# Patient Record
Sex: MALE | Race: BLACK OR AFRICAN AMERICAN | NOT HISPANIC OR LATINO | Employment: FULL TIME | URBAN - METROPOLITAN AREA
[De-identification: names, ages, dates, MRNs, and addresses within clinical notes are randomized per-mention and may not be internally consistent; named-entity substitution may affect disease eponyms.]

---

## 2018-03-27 ENCOUNTER — OFFICE VISIT (OUTPATIENT)
Dept: CARDIOLOGY | Facility: CLINIC | Age: 68
End: 2018-03-27
Attending: INTERNAL MEDICINE
Payer: MEDICARE

## 2018-03-27 VITALS
HEIGHT: 72 IN | SYSTOLIC BLOOD PRESSURE: 132 MMHG | WEIGHT: 146 LBS | HEART RATE: 71 BPM | DIASTOLIC BLOOD PRESSURE: 79 MMHG | BODY MASS INDEX: 19.77 KG/M2

## 2018-03-27 DIAGNOSIS — Z79.01 CHRONIC ANTICOAGULATION: ICD-10-CM

## 2018-03-27 DIAGNOSIS — I48.20 CHRONIC ATRIAL FIBRILLATION: ICD-10-CM

## 2018-03-27 PROCEDURE — 93000 ELECTROCARDIOGRAM COMPLETE: CPT | Mod: S$GLB,,, | Performed by: INTERNAL MEDICINE

## 2018-03-27 PROCEDURE — 99214 OFFICE O/P EST MOD 30 MIN: CPT | Mod: S$GLB,,, | Performed by: INTERNAL MEDICINE

## 2018-03-27 NOTE — PROGRESS NOTES
Subjective:     Hany Glass is a 67 y.o. male with essentially unremarkable past medical history. Noted to have mild palpitations in about 12/2014. Did not really bother him. Then saw his internist on 4/17/2015 and noted to be in atrial fibrillation with well controlled ventricular response rate. He was referred for an Echocardiogram on 5/26/2015 that revealed normal left ventricular size with mildly depressed systolic function. The left atrium was of normal size. On 5/29/2015 he underwent a Holter which revealed paroxysmal atrial fibrillation with intermittent atrial fibrillation with well controlled as well as fast ventricular response rate with activities. There was normal sinus rhythm in between. There were no symptoms during the Holter recording. I saw him on 9/18/2015 and with a CHADS2 score of 0 but with a [id number] of 1 (A) especially in the setting of mild left ventricular dysfunction it was felt he should be anticoagulated and he was advised to start rivaroxaban and stop aspirin. He was given the prescription but decided to stay on the aspirin as he was feeling well and did not have any palpitations. He was feeling well in 1/2016 when I saw him and he again opted to stay on aspirin. On 6/12/2017 he had a Holter which revealed persistent atrial fibrillation 76 () bpm. There has been no weak spells. No exertional chest pain or exertional dyspnea. No bleeding. Feeling well overall.    Atrial Fibrillation   Presents for follow-up visit. Symptoms are negative for bradycardia, chest pain, dizziness, hemodynamic instability, hypertension, hypotension, palpitations, shortness of breath, syncope, tachycardia and weakness. The symptoms have been stable. Past treatments include nothing. Past medical history includes atrial fibrillation.       Review of Systems   Constitution: Negative for chills, fever, weakness and malaise/fatigue.   HENT: Negative for nosebleeds.    Eyes: Negative for discharge,  vision loss in left eye and vision loss in right eye.   Cardiovascular: Negative for chest pain, claudication, dyspnea on exertion, irregular heartbeat, leg swelling, near-syncope, orthopnea, palpitations, paroxysmal nocturnal dyspnea and syncope.   Respiratory: Negative for cough, hemoptysis, shortness of breath and wheezing.    Endocrine: Negative for cold intolerance and heat intolerance.   Hematologic/Lymphatic: Negative for bleeding problem. Does not bruise/bleed easily.   Skin: Negative for color change and rash.   Musculoskeletal: Negative for arthritis, falls and myalgias.   Gastrointestinal: Negative for heartburn, hematemesis, hematochezia, hemorrhoids, jaundice, melena, nausea and vomiting.   Genitourinary: Negative for dysuria and hematuria.   Neurological: Negative for dizziness, focal weakness, headaches, light-headedness, loss of balance and vertigo.   Psychiatric/Behavioral: Negative for altered mental status, depression and substance abuse. The patient is not nervous/anxious.    Allergic/Immunologic: Negative for hives and persistent infections.       Current Outpatient Prescriptions on File Prior to Visit   Medication Sig Dispense Refill    aspirin (ECOTRIN) 81 MG EC tablet TAKE 1 TABLET BY MOUTH EVERY DAY 30 tablet 0    metoprolol succinate (TOPROL-XL) 25 MG 24 hr tablet Take 1 tablet (25 mg total) by mouth once daily. 90 tablet 3     No current facility-administered medications on file prior to visit.        /79   Pulse 71   Ht 6' (1.829 m)   Wt 66.2 kg (146 lb)   BMI 19.80 kg/m²       Objective:     Physical Exam   Constitutional: He is oriented to person, place, and time. He appears well-developed and well-nourished. He does not appear ill. No distress.   HENT:   Head: Normocephalic and atraumatic.   Nose: Nose normal.   Mouth/Throat: No oropharyngeal exudate.   Eyes: Right eye exhibits no discharge. Left eye exhibits no discharge. Right conjunctiva has no hemorrhage. Left  conjunctiva has no hemorrhage. Right pupil is round. Left pupil is round. Pupils are equal.   Neck: Neck supple. No JVD present. Carotid bruit is not present. No thyromegaly present.   Cardiovascular: Normal rate, S1 normal and S2 normal.  An irregularly irregular rhythm present.  No extrasystoles are present. PMI is not displaced.  Exam reveals no gallop.    No murmur heard.  Pulses:       Radial pulses are 2+ on the right side, and 2+ on the left side.        Dorsalis pedis pulses are 2+ on the right side, and 2+ on the left side.        Posterior tibial pulses are 2+ on the right side, and 2+ on the left side.   Pulmonary/Chest: Effort normal and breath sounds normal.   Abdominal: Soft. Normal appearance. There is no hepatosplenomegaly. There is no tenderness.   Musculoskeletal:        Right ankle: He exhibits no swelling, no ecchymosis and no deformity.        Left ankle: He exhibits no swelling, no ecchymosis and no deformity.   Lymphadenopathy:        Head (right side): No submandibular adenopathy present.        Head (left side): No submandibular adenopathy present.     He has no cervical adenopathy.   Neurological: He is alert and oriented to person, place, and time. He is not disoriented. No cranial nerve deficit or sensory deficit.   Skin: Skin is warm, dry and intact. No rash noted. He is not diaphoretic. Nails show no clubbing.   Psychiatric: He has a normal mood and affect. His speech is normal and behavior is normal. Judgment and thought content normal. Cognition and memory are normal.       Assessment:      1. Chronic atrial fibrillation    2. Chronic anticoagulation        Plan:     1. Atrial Fibrillation   4/17/2015: Diagnosed with essentially asymptomatic paroxysmal atrial fibrillation.    CHADS2=0. [id number]=0 (A).    5/26/2015: Echo: Normal left ventricular size with mildly depressed systolic function. Normal LA size.   5/29/2015: Holter: Paroxysmal atrial fibrillation with intermittent atrial  fibrillation with well as well as fast VRR. Sinus in between. No symptoms.   1/21/2016: ECG: NSR but on exam appeared to be in atrial fibrillation.   12/8/2016: ECG: Atrial fibrillation with VRR of 75 bpm.   6/12/2017: Holter: Atrial fibrillation 76 () bpm.   On metoprolol 25 mg Q24.   On aspirin 81 mg Q24 but advised NOAC.   Discussed issues at length with patient: Stroke risk 2-3 %/year.   Advised strongly to change to NOAC.   Told to change to apixiban.   Could possibly consider an ablation. Discussed in detail.     2. Chronic Anticoagulation   4/17/2015: Diagnosed with essentially asymptomatic paroxysmal atrial fibrillation.    CHADS2=0. [id number]=8 (A).    6/2016: Advised apixiban 5 mg Q12.   3/27/2018: Again strongly advised apixiban.    On aspirin 81 mg Q24.   No aspirin or NSAID.   No bleeding.      3. Primary Care   Dr. Monet Leach    F/u 4 months.    Rickie Gallardo M.D.

## 2018-08-08 ENCOUNTER — OFFICE VISIT (OUTPATIENT)
Dept: CARDIOLOGY | Facility: CLINIC | Age: 68
End: 2018-08-08
Attending: INTERNAL MEDICINE
Payer: MEDICARE

## 2018-08-08 VITALS
BODY MASS INDEX: 19.64 KG/M2 | SYSTOLIC BLOOD PRESSURE: 136 MMHG | WEIGHT: 145 LBS | DIASTOLIC BLOOD PRESSURE: 74 MMHG | HEART RATE: 77 BPM | HEIGHT: 72 IN

## 2018-08-08 DIAGNOSIS — I48.19 PERSISTENT ATRIAL FIBRILLATION: ICD-10-CM

## 2018-08-08 DIAGNOSIS — I48.20 CHRONIC ATRIAL FIBRILLATION: ICD-10-CM

## 2018-08-08 DIAGNOSIS — Z79.01 CHRONIC ANTICOAGULATION: ICD-10-CM

## 2018-08-08 PROCEDURE — 99214 OFFICE O/P EST MOD 30 MIN: CPT | Mod: S$GLB,,, | Performed by: INTERNAL MEDICINE

## 2018-08-08 RX ORDER — METOPROLOL SUCCINATE 25 MG/1
25 TABLET, EXTENDED RELEASE ORAL DAILY
Qty: 90 TABLET | Refills: 3 | Status: SHIPPED | OUTPATIENT
Start: 2018-08-08 | End: 2018-09-05

## 2018-08-08 NOTE — PROGRESS NOTES
Subjective:     Hany Glass is a 67 y.o. male with essentially unremarkable past medical history. He noted to have mild palpitations in about 12/2014. Did not really bother him. He saw his internist on 4/17/2015 and noted to be in atrial fibrillation with well controlled ventricular response rate. He was referred for an Echocardiogram on 5/26/2015 that revealed normal left ventricular size with mildly depressed systolic function. The left atrium was of normal size. On 5/29/2015 he underwent a Holter which revealed paroxysmal atrial fibrillation with intermittent atrial fibrillation with well controlled as well as fast ventricular response rate with activities. There was normal sinus rhythm in between. There were no symptoms during the Holter recording. I saw him on 9/18/2015 and with a CHADS2 score of 0 but with a [id number] of 1 (A) especially in the setting of mild left ventricular dysfunction it was felt he should be anticoagulated and he was advised to start rivaroxaban and stop aspirin. He was given the prescription but decided to stay on the aspirin as he was feeling well and did not have any palpitations. He was feeling well in 1/2016 when I saw him and he again opted to stay on aspirin. On 6/12/2017 he had a Holter which revealed persistent atrial fibrillation 76 () bpm. There has been no weak spells. No exertional chest pain or exertional dyspnea. No bleeding. Feeling well overall.    Atrial Fibrillation   Presents for follow-up visit. Symptoms are negative for bradycardia, chest pain, dizziness, hemodynamic instability, hypertension, hypotension, palpitations, shortness of breath, syncope, tachycardia and weakness. The symptoms have been stable. Past treatments include nothing. Past medical history includes atrial fibrillation.       Review of Systems   Constitution: Negative for chills, fever, weakness and malaise/fatigue.   HENT: Negative for nosebleeds.    Eyes: Negative for discharge,  vision loss in left eye and vision loss in right eye.   Cardiovascular: Negative for chest pain, claudication, dyspnea on exertion, irregular heartbeat, leg swelling, near-syncope, orthopnea, palpitations, paroxysmal nocturnal dyspnea and syncope.   Respiratory: Negative for cough, hemoptysis, shortness of breath and wheezing.    Endocrine: Negative for cold intolerance and heat intolerance.   Hematologic/Lymphatic: Negative for bleeding problem. Does not bruise/bleed easily.   Skin: Negative for color change and rash.   Musculoskeletal: Negative for arthritis, falls and myalgias.   Gastrointestinal: Negative for heartburn, hematemesis, hematochezia, hemorrhoids, jaundice, melena, nausea and vomiting.   Genitourinary: Negative for dysuria and hematuria.   Neurological: Negative for dizziness, focal weakness, headaches, light-headedness, loss of balance and vertigo.   Psychiatric/Behavioral: Negative for altered mental status, depression and substance abuse. The patient is not nervous/anxious.    Allergic/Immunologic: Negative for hives and persistent infections.       Current Outpatient Prescriptions on File Prior to Visit   Medication Sig Dispense Refill    ASPIR-LOW 81 mg EC tablet TAKE 1 TABLET BY MOUTH EVERY DAY 30 tablet 0    metoprolol succinate (TOPROL-XL) 25 MG 24 hr tablet TAKE 1 TABLET(25 MG) BY MOUTH EVERY DAY 90 tablet 0     No current facility-administered medications on file prior to visit.        /74   Pulse 77   Ht 6' (1.829 m)   Wt 65.8 kg (145 lb)   BMI 19.67 kg/m²       Objective:     Physical Exam   Constitutional: He is oriented to person, place, and time. He appears well-developed and well-nourished. He does not appear ill. No distress.   HENT:   Head: Normocephalic and atraumatic.   Nose: Nose normal.   Mouth/Throat: No oropharyngeal exudate.   Eyes: Right eye exhibits no discharge. Left eye exhibits no discharge. Right conjunctiva has no hemorrhage. Left conjunctiva has no  hemorrhage. Right pupil is round. Left pupil is round. Pupils are equal.   Neck: Neck supple. No JVD present. Carotid bruit is not present. No thyromegaly present.   Cardiovascular: Normal rate, S1 normal and S2 normal.  An irregularly irregular rhythm present.  No extrasystoles are present. PMI is not displaced.  Exam reveals no gallop.    No murmur heard.  Pulses:       Radial pulses are 2+ on the right side, and 2+ on the left side.        Dorsalis pedis pulses are 2+ on the right side, and 2+ on the left side.        Posterior tibial pulses are 2+ on the right side, and 2+ on the left side.   Pulmonary/Chest: Effort normal and breath sounds normal.   Abdominal: Soft. Normal appearance. There is no hepatosplenomegaly. There is no tenderness.   Musculoskeletal:        Right ankle: He exhibits no swelling, no ecchymosis and no deformity.        Left ankle: He exhibits no swelling, no ecchymosis and no deformity.   Lymphadenopathy:        Head (right side): No submandibular adenopathy present.        Head (left side): No submandibular adenopathy present.     He has no cervical adenopathy.   Neurological: He is alert and oriented to person, place, and time. He is not disoriented. No cranial nerve deficit or sensory deficit.   Skin: Skin is warm, dry and intact. No rash noted. He is not diaphoretic. Nails show no clubbing.   Psychiatric: He has a normal mood and affect. His speech is normal and behavior is normal. Judgment and thought content normal. Cognition and memory are normal.       Assessment:      1. Chronic atrial fibrillation    2. Chronic anticoagulation        Plan:     1. Atrial Fibrillation   4/17/2015: Diagnosed with essentially asymptomatic paroxysmal atrial fibrillation.    CHADS2=0. [id number]=9 (A).    5/26/2015: Echo: Normal left ventricular size with mildly depressed systolic function. Normal LA size.   5/29/2015: Holter: Paroxysmal atrial fibrillation with intermittent atrial fibrillation with  well as well as fast VRR. Sinus in between. No symptoms.   1/21/2016: ECG: NSR but on exam appeared to be in atrial fibrillation.   12/8/2016: ECG: Atrial fibrillation with VRR of 75 bpm.   6/12/2017: Holter: Atrial fibrillation 76 () bpm.   On metoprolol 25 mg Q24.   On aspirin 81 mg Q24 but advised NOAC.   Discussed issues at length with patient: Stroke risk 2-3 %/year.   Advised strongly to change to NOAC.   8/8/2018: Told to change to apixiban.   Could possibly consider an ablation. Discussed in detail.     2. Chronic Anticoagulation   4/17/2015: Diagnosed with essentially asymptomatic paroxysmal atrial fibrillation.    CHADS2=0. [id number]=3 (A).    6/2016: Advised apixiban 5 mg Q12.   3/27/2018: Again strongly advised apixiban.    On aspirin 81 mg Q24.   No aspirin or NSAID.   No bleeding.      3. Primary Care   Dr. Monet Leach    F/u 4 months.    Rickie Gallardo M.D.

## 2018-12-12 ENCOUNTER — OFFICE VISIT (OUTPATIENT)
Dept: CARDIOLOGY | Facility: CLINIC | Age: 68
End: 2018-12-12
Attending: INTERNAL MEDICINE
Payer: MEDICARE

## 2018-12-12 VITALS
SYSTOLIC BLOOD PRESSURE: 149 MMHG | BODY MASS INDEX: 19.91 KG/M2 | DIASTOLIC BLOOD PRESSURE: 85 MMHG | HEIGHT: 72 IN | WEIGHT: 147 LBS | HEART RATE: 80 BPM

## 2018-12-12 DIAGNOSIS — Z79.01 CHRONIC ANTICOAGULATION: ICD-10-CM

## 2018-12-12 DIAGNOSIS — I48.19 PERSISTENT ATRIAL FIBRILLATION: ICD-10-CM

## 2018-12-12 DIAGNOSIS — I48.20 CHRONIC ATRIAL FIBRILLATION: ICD-10-CM

## 2018-12-12 PROCEDURE — 99214 OFFICE O/P EST MOD 30 MIN: CPT | Mod: S$GLB,,, | Performed by: INTERNAL MEDICINE

## 2018-12-12 RX ORDER — METOPROLOL SUCCINATE 25 MG/1
25 TABLET, EXTENDED RELEASE ORAL DAILY
Qty: 90 TABLET | Refills: 3 | Status: SHIPPED | OUTPATIENT
Start: 2018-12-12 | End: 2019-08-20 | Stop reason: SDUPTHER

## 2018-12-12 NOTE — PROGRESS NOTES
Subjective:     Hany Glass is a 68 y.o. male with essentially unremarkable past medical history. He noted to have mild palpitations in about 12/2014. Did not really bother him. He saw his internist on 4/17/2015 and noted to be in atrial fibrillation with well controlled ventricular response rate. He was referred for an Echocardiogram on 5/26/2015 that revealed normal left ventricular size with mildly depressed systolic function. The left atrium was of normal size. On 5/29/2015 he underwent a Holter which revealed paroxysmal atrial fibrillation with intermittent atrial fibrillation with well controlled as well as fast ventricular response rate with activities. There was normal sinus rhythm in between. There were no symptoms during the Holter recording. I saw him on 9/18/2015 and with a CHADS2 score of 0 but with a [id number] of 1 (A) especially in the setting of mild left ventricular dysfunction it was felt he should be anticoagulated and he was advised to start rivaroxaban and stop aspirin. He was given the prescription but decided to stay on the aspirin as he was feeling well and did not have any palpitations. He was feeling well in 1/2016 when I saw him and he again opted to stay on aspirin. On 6/12/2017 he had a Holter which revealed persistent atrial fibrillation 76 () bpm. There has been no weak spells. No exertional chest pain or exertional dyspnea. No bleeding. Feeling well overall.      Atrial Fibrillation   Presents for follow-up visit. Symptoms are negative for bradycardia, chest pain, dizziness, hemodynamic instability, hypertension, hypotension, palpitations, shortness of breath, syncope, tachycardia and weakness. The symptoms have been stable. Past treatments include nothing. Past medical history includes atrial fibrillation.       Review of Systems   Constitution: Negative for chills, fever, weakness and malaise/fatigue.   HENT: Negative for nosebleeds.    Eyes: Negative for discharge,  vision loss in left eye and vision loss in right eye.   Cardiovascular: Negative for chest pain, claudication, dyspnea on exertion, irregular heartbeat, leg swelling, near-syncope, orthopnea, palpitations, paroxysmal nocturnal dyspnea and syncope.   Respiratory: Negative for cough, hemoptysis, shortness of breath and wheezing.    Endocrine: Negative for cold intolerance and heat intolerance.   Hematologic/Lymphatic: Negative for bleeding problem. Does not bruise/bleed easily.   Skin: Negative for color change and rash.   Musculoskeletal: Negative for arthritis, falls and myalgias.   Gastrointestinal: Negative for heartburn, hematemesis, hematochezia, hemorrhoids, jaundice, melena, nausea and vomiting.   Genitourinary: Negative for dysuria and hematuria.   Neurological: Negative for dizziness, focal weakness, headaches, light-headedness, loss of balance and vertigo.   Psychiatric/Behavioral: Negative for altered mental status, depression and substance abuse. The patient is not nervous/anxious.    Allergic/Immunologic: Negative for hives and persistent infections.       Current Outpatient Medications on File Prior to Visit   Medication Sig Dispense Refill    apixaban 5 mg Tab Take 1 tablet (5 mg total) by mouth 2 (two) times daily. 180 tablet 3    metoprolol succinate (TOPROL-XL) 25 MG 24 hr tablet TAKE 1 TABLET(25 MG) BY MOUTH EVERY DAY 90 tablet 0     No current facility-administered medications on file prior to visit.        BP (!) 149/85   Pulse 80   Ht 6' (1.829 m)   Wt 66.7 kg (147 lb)   BMI 19.94 kg/m²       Objective:     Physical Exam   Constitutional: He is oriented to person, place, and time. He appears well-developed and well-nourished. He does not appear ill. No distress.   HENT:   Head: Normocephalic and atraumatic.   Nose: Nose normal.   Mouth/Throat: No oropharyngeal exudate.   Eyes: Right eye exhibits no discharge. Left eye exhibits no discharge. Right conjunctiva has no hemorrhage. Left  conjunctiva has no hemorrhage. Right pupil is round. Left pupil is round. Pupils are equal.   Neck: Neck supple. No JVD present. Carotid bruit is not present. No thyromegaly present.   Cardiovascular: Normal rate, S1 normal and S2 normal. An irregularly irregular rhythm present.  No extrasystoles are present. PMI is not displaced. Exam reveals no gallop.   No murmur heard.  Pulses:       Radial pulses are 2+ on the right side, and 2+ on the left side.        Dorsalis pedis pulses are 2+ on the right side, and 2+ on the left side.        Posterior tibial pulses are 2+ on the right side, and 2+ on the left side.   Pulmonary/Chest: Effort normal and breath sounds normal.   Abdominal: Soft. Normal appearance. There is no hepatosplenomegaly. There is no tenderness.   Musculoskeletal:        Right ankle: He exhibits no swelling, no ecchymosis and no deformity.        Left ankle: He exhibits no swelling, no ecchymosis and no deformity.   Lymphadenopathy:        Head (right side): No submandibular adenopathy present.        Head (left side): No submandibular adenopathy present.     He has no cervical adenopathy.   Neurological: He is alert and oriented to person, place, and time. He is not disoriented. No cranial nerve deficit or sensory deficit.   Skin: Skin is warm, dry and intact. No rash noted. He is not diaphoretic.   Psychiatric: He has a normal mood and affect. His speech is normal and behavior is normal. Judgment and thought content normal. Cognition and memory are normal.       Assessment:      1. Chronic atrial fibrillation    2. Chronic anticoagulation        Plan:     1. Atrial Fibrillation   4/17/2015: Diagnosed with essentially asymptomatic paroxysmal atrial fibrillation.    CHADS2=0. [id number]=8 (A).    5/26/2015: Echo: Normal left ventricular size with mildly depressed systolic function. Normal LA size.   5/29/2015: Holter: Paroxysmal atrial fibrillation with intermittent atrial fibrillation with well as  well as fast VRR. Sinus in between. No symptoms.   1/21/2016: ECG: NSR but on exam appeared to be in atrial fibrillation.   12/8/2016: ECG: Atrial fibrillation with VRR of 75 bpm.   6/12/2017: Holter: Atrial fibrillation 76 () bpm.   Discussed issues at length with patient: Stroke risk 2-3 %/year.   Could possibly consider an ablation. Discussed in detail.   8/8/2018: Began apixiban.   On metoprolol 25 mg Q24.   Rate appears well controlled.      2. Chronic Anticoagulation   4/17/2015: Diagnosed with essentially asymptomatic paroxysmal atrial fibrillation.    CHADS2=0. [id number]=8 (A).    6/2016: Advised apixiban.   8/8/2018: Began apixiban.   On apixiban 5 mg Q12.   No aspirin or NSAID.   No bleeding.    3. Blood Pressure   12/12/2018: 149/85 mmHg.   He will monitor at home.      4. Primary Care   Dr. Monet Leach    F/u 4 months.    Rickie Gallardo M.D.

## 2019-04-24 ENCOUNTER — OFFICE VISIT (OUTPATIENT)
Dept: CARDIOLOGY | Facility: CLINIC | Age: 69
End: 2019-04-24
Attending: INTERNAL MEDICINE
Payer: MEDICARE

## 2019-04-24 VITALS
HEART RATE: 66 BPM | SYSTOLIC BLOOD PRESSURE: 137 MMHG | HEIGHT: 72 IN | BODY MASS INDEX: 19.77 KG/M2 | DIASTOLIC BLOOD PRESSURE: 66 MMHG | WEIGHT: 146 LBS

## 2019-04-24 DIAGNOSIS — Z79.01 CHRONIC ANTICOAGULATION: ICD-10-CM

## 2019-04-24 DIAGNOSIS — I48.20 CHRONIC ATRIAL FIBRILLATION: ICD-10-CM

## 2019-04-24 PROCEDURE — 99214 OFFICE O/P EST MOD 30 MIN: CPT | Mod: 25,S$GLB,, | Performed by: INTERNAL MEDICINE

## 2019-04-24 PROCEDURE — 93000 ELECTROCARDIOGRAM COMPLETE: CPT | Mod: S$GLB,,, | Performed by: INTERNAL MEDICINE

## 2019-04-24 PROCEDURE — 93000 PR ELECTROCARDIOGRAM, COMPLETE: ICD-10-PCS | Mod: S$GLB,,, | Performed by: INTERNAL MEDICINE

## 2019-04-24 PROCEDURE — 99214 PR OFFICE/OUTPT VISIT, EST, LEVL IV, 30-39 MIN: ICD-10-PCS | Mod: 25,S$GLB,, | Performed by: INTERNAL MEDICINE

## 2019-04-24 NOTE — PROGRESS NOTES
Subjective:     Hany Glass is a 68 y.o. male with essentially unremarkable past medical history. He noted to have mild palpitations in about 12/2014. Did not really bother him. He saw his internist on 4/17/2015 and noted to be in atrial fibrillation with well controlled ventricular response rate. He was referred for an Echocardiogram on 5/26/2015 that revealed normal left ventricular size with mildly depressed systolic function. The left atrium was of normal size. On 5/29/2015 he underwent a Holter which revealed paroxysmal atrial fibrillation with intermittent atrial fibrillation with well controlled as well as fast ventricular response rate with activities. There was normal sinus rhythm in between. There were no symptoms during the Holter recording. I saw him on 9/18/2015 and with a CHADS2 score of 0 but with a [id number] of 1 (A) especially in the setting of mild left ventricular dysfunction it was felt he should be anticoagulated and he was advised to start rivaroxaban and stop aspirin. He was given the prescription but decided to stay on the aspirin as he was feeling well and did not have any palpitations. He was feeling well in 1/2016 when I saw him and he again opted to stay on aspirin. On 6/12/2017 he had a Holter which revealed persistent atrial fibrillation 76 () bpm. There has been no weak spells. No exertional chest pain or exertional dyspnea. No bleeding. Feeling well overall.      Atrial Fibrillation   Presents for follow-up visit. Symptoms are negative for bradycardia, chest pain, dizziness, hemodynamic instability, hypertension, hypotension, palpitations, shortness of breath, syncope, tachycardia and weakness. The symptoms have been stable. Past treatments include nothing. Past medical history includes atrial fibrillation.       Review of Systems   Constitution: Negative for chills, fever and malaise/fatigue.   HENT: Negative for nosebleeds.    Eyes: Negative for discharge, vision loss  in left eye and vision loss in right eye.   Cardiovascular: Negative for chest pain, claudication, dyspnea on exertion, irregular heartbeat, leg swelling, near-syncope, orthopnea, palpitations, paroxysmal nocturnal dyspnea and syncope.   Respiratory: Negative for cough, hemoptysis, shortness of breath and wheezing.    Endocrine: Negative for cold intolerance and heat intolerance.   Hematologic/Lymphatic: Negative for bleeding problem. Does not bruise/bleed easily.   Skin: Negative for color change and rash.   Musculoskeletal: Negative for arthritis, falls and myalgias.   Gastrointestinal: Negative for heartburn, hematemesis, hematochezia, hemorrhoids, jaundice, melena, nausea and vomiting.   Genitourinary: Negative for dysuria and hematuria.   Neurological: Negative for dizziness, focal weakness, headaches, light-headedness, loss of balance, vertigo and weakness.   Psychiatric/Behavioral: Negative for altered mental status, depression and substance abuse. The patient is not nervous/anxious.    Allergic/Immunologic: Negative for hives and persistent infections.       Current Outpatient Medications on File Prior to Visit   Medication Sig Dispense Refill    apixaban (ELIQUIS) 5 mg Tab Take 1 tablet (5 mg total) by mouth 2 (two) times daily. 180 tablet 3    metoprolol succinate (TOPROL-XL) 25 MG 24 hr tablet Take 1 tablet (25 mg total) by mouth once daily. 90 tablet 3     No current facility-administered medications on file prior to visit.        /66   Pulse 66   Ht 6' (1.829 m)   Wt 66.2 kg (146 lb)   BMI 19.80 kg/m²       Objective:     Physical Exam   Constitutional: He is oriented to person, place, and time. He appears well-developed and well-nourished. He does not appear ill. No distress.   HENT:   Head: Normocephalic and atraumatic.   Nose: Nose normal.   Mouth/Throat: No oropharyngeal exudate.   Eyes: Right eye exhibits no discharge. Left eye exhibits no discharge. Right conjunctiva has no hemorrhage.  Left conjunctiva has no hemorrhage. Right pupil is round. Left pupil is round. Pupils are equal.   Neck: Neck supple. No JVD present. Carotid bruit is not present. No thyromegaly present.   Cardiovascular: Normal rate, S1 normal and S2 normal. An irregularly irregular rhythm present.  No extrasystoles are present. PMI is not displaced. Exam reveals no gallop.   No murmur heard.  Pulses:       Radial pulses are 2+ on the right side, and 2+ on the left side.        Dorsalis pedis pulses are 2+ on the right side, and 2+ on the left side.        Posterior tibial pulses are 2+ on the right side, and 2+ on the left side.   Pulmonary/Chest: Effort normal and breath sounds normal.   Abdominal: Soft. Normal appearance. There is no hepatosplenomegaly. There is no tenderness.   Musculoskeletal:        Right ankle: He exhibits no swelling, no ecchymosis and no deformity.        Left ankle: He exhibits no swelling, no ecchymosis and no deformity.   Lymphadenopathy:        Head (right side): No submandibular adenopathy present.        Head (left side): No submandibular adenopathy present.     He has no cervical adenopathy.   Neurological: He is alert and oriented to person, place, and time. He is not disoriented. No cranial nerve deficit or sensory deficit.   Skin: Skin is warm, dry and intact. No rash noted. He is not diaphoretic.   Psychiatric: He has a normal mood and affect. His speech is normal and behavior is normal. Judgment and thought content normal. Cognition and memory are normal.       Assessment:      1. Chronic atrial fibrillation    2. Chronic anticoagulation        Plan:     1. Atrial Fibrillation   4/17/2015: Diagnosed with essentially asymptomatic paroxysmal atrial fibrillation.    [id number]=6 (A).    5/26/2015: Echo: Normal left ventricular size with mildly depressed systolic function. Normal LA size.   5/29/2015: Holter: Paroxysmal atrial fibrillation with intermittent atrial fibrillation with well as well  as fast VRR. Sinus in between. No symptoms.   1/21/2016: ECG: NSR but on exam appeared to be in atrial fibrillation.   12/8/2016: ECG: Atrial fibrillation with VRR of 75 bpm.   6/12/2017: Holter: Atrial fibrillation 76 () bpm.   Discussed issues at length with patient: Stroke risk 2-3 %/year.   Could possibly consider an ablation. Discussed in detail.   8/8/2018: Began apixiban.   On metoprolol 25 mg Q24.   Rate appears well controlled.      2. Chronic Anticoagulation   4/17/2015: Diagnosed with essentially asymptomatic paroxysmal atrial fibrillation.    [id number]=2 (A).    6/2016: Advised apixiban.   8/8/2018: Began apixiban.   On apixiban 5 mg Q12.   No aspirin or NSAID.   No bleeding.    3. Blood Pressure   12/12/2018: 149/85 mmHg.   4/24/2019: 137/66 mmHg.   Keeping log at home.   Well controlled at home.      4. Primary Care   Dr. Monet Leach    F/u 4 months.    Rickie Gallardo M.D.

## 2019-08-20 ENCOUNTER — OFFICE VISIT (OUTPATIENT)
Dept: CARDIOLOGY | Facility: CLINIC | Age: 69
End: 2019-08-20
Attending: INTERNAL MEDICINE
Payer: MEDICARE

## 2019-08-20 VITALS
BODY MASS INDEX: 20.18 KG/M2 | SYSTOLIC BLOOD PRESSURE: 133 MMHG | DIASTOLIC BLOOD PRESSURE: 62 MMHG | WEIGHT: 149 LBS | HEART RATE: 65 BPM | HEIGHT: 72 IN

## 2019-08-20 DIAGNOSIS — I48.20 CHRONIC ATRIAL FIBRILLATION: ICD-10-CM

## 2019-08-20 DIAGNOSIS — Z79.01 CHRONIC ANTICOAGULATION: ICD-10-CM

## 2019-08-20 PROCEDURE — 99214 PR OFFICE/OUTPT VISIT, EST, LEVL IV, 30-39 MIN: ICD-10-PCS | Mod: S$GLB,,, | Performed by: INTERNAL MEDICINE

## 2019-08-20 PROCEDURE — 99214 OFFICE O/P EST MOD 30 MIN: CPT | Mod: S$GLB,,, | Performed by: INTERNAL MEDICINE

## 2019-08-20 RX ORDER — METOPROLOL SUCCINATE 25 MG/1
25 TABLET, EXTENDED RELEASE ORAL DAILY
Qty: 90 TABLET | Refills: 3 | Status: SHIPPED | OUTPATIENT
Start: 2019-08-20 | End: 2019-12-17 | Stop reason: SDUPTHER

## 2019-12-15 DIAGNOSIS — Z79.01 CHRONIC ANTICOAGULATION: ICD-10-CM

## 2019-12-15 DIAGNOSIS — I48.20 CHRONIC ATRIAL FIBRILLATION: ICD-10-CM

## 2019-12-16 RX ORDER — APIXABAN 5 MG/1
TABLET, FILM COATED ORAL
Qty: 180 TABLET | Refills: 0 | Status: SHIPPED | OUTPATIENT
Start: 2019-12-16 | End: 2019-12-17

## 2019-12-17 ENCOUNTER — OFFICE VISIT (OUTPATIENT)
Dept: CARDIOLOGY | Facility: CLINIC | Age: 69
End: 2019-12-17
Attending: INTERNAL MEDICINE
Payer: MEDICARE

## 2019-12-17 VITALS
DIASTOLIC BLOOD PRESSURE: 79 MMHG | SYSTOLIC BLOOD PRESSURE: 143 MMHG | BODY MASS INDEX: 20.59 KG/M2 | HEIGHT: 72 IN | HEART RATE: 72 BPM | WEIGHT: 152 LBS

## 2019-12-17 DIAGNOSIS — I48.20 CHRONIC ATRIAL FIBRILLATION: ICD-10-CM

## 2019-12-17 DIAGNOSIS — Z79.01 CHRONIC ANTICOAGULATION: ICD-10-CM

## 2019-12-17 DIAGNOSIS — I48.21 PERMANENT ATRIAL FIBRILLATION: ICD-10-CM

## 2019-12-17 PROCEDURE — 99214 PR OFFICE/OUTPT VISIT, EST, LEVL IV, 30-39 MIN: ICD-10-PCS | Mod: S$GLB,,, | Performed by: INTERNAL MEDICINE

## 2019-12-17 PROCEDURE — 99214 OFFICE O/P EST MOD 30 MIN: CPT | Mod: S$GLB,,, | Performed by: INTERNAL MEDICINE

## 2019-12-17 PROCEDURE — 1159F MED LIST DOCD IN RCRD: CPT | Mod: S$GLB,,, | Performed by: INTERNAL MEDICINE

## 2019-12-17 PROCEDURE — 1159F PR MEDICATION LIST DOCUMENTED IN MEDICAL RECORD: ICD-10-PCS | Mod: S$GLB,,, | Performed by: INTERNAL MEDICINE

## 2019-12-17 RX ORDER — METOPROLOL SUCCINATE 25 MG/1
25 TABLET, EXTENDED RELEASE ORAL DAILY
Qty: 90 TABLET | Refills: 3 | Status: SHIPPED | OUTPATIENT
Start: 2019-12-17 | End: 2020-06-04 | Stop reason: SDUPTHER

## 2019-12-17 NOTE — PROGRESS NOTES
Subjective:     Hany Glass is a 69 y.o. male with essentially unremarkable past medical history. He noted to have mild palpitations in about 12/2014. Did not really bother him. He saw his internist on 4/17/2015 and noted to be in atrial fibrillation with well controlled ventricular response rate. He was referred for an Echocardiogram on 5/26/2015 that revealed normal left ventricular size with mildly depressed systolic function. The left atrium was of normal size. On 5/29/2015 he underwent a Holter which revealed paroxysmal atrial fibrillation with intermittent atrial fibrillation with well controlled as well as fast ventricular response rate with activities. There was normal sinus rhythm in between. There were no symptoms during the Holter recording. I saw him on 9/18/2015 and with but with a [id number] of 1 (A) especially in the setting of mild left ventricular dysfunction it was felt he should be anticoagulated and he was advised to start rivaroxaban and stop aspirin. He was given the prescription but decided to stay on the aspirin as he was feeling well and did not have any palpitations. He was feeling well in 1/2016 when I saw him and he again opted to stay on aspirin. On 6/12/2017 he had a Holter which revealed persistent atrial fibrillation 76 () bpm. There has been no weak spells. No exertional chest pain or exertional dyspnea. No bleeding. Feeling well overall.      Atrial Fibrillation   Presents for follow-up visit. Symptoms are negative for bradycardia, chest pain, dizziness, hemodynamic instability, hypertension, hypotension, palpitations, shortness of breath, syncope, tachycardia and weakness. The symptoms have been stable. Past treatments include nothing. Past medical history includes atrial fibrillation.       Review of Systems   Constitution: Negative for chills, fever and malaise/fatigue.   HENT: Negative for nosebleeds.    Eyes: Negative for discharge, vision loss in left eye and  vision loss in right eye.   Cardiovascular: Negative for chest pain, claudication, dyspnea on exertion, irregular heartbeat, leg swelling, near-syncope, orthopnea, palpitations, paroxysmal nocturnal dyspnea and syncope.   Respiratory: Negative for cough, hemoptysis, shortness of breath and wheezing.    Endocrine: Negative for cold intolerance and heat intolerance.   Hematologic/Lymphatic: Negative for bleeding problem. Does not bruise/bleed easily.   Skin: Negative for color change and rash.   Musculoskeletal: Negative for arthritis, falls and myalgias.   Gastrointestinal: Negative for heartburn, hematemesis, hematochezia, hemorrhoids, jaundice, melena, nausea and vomiting.   Genitourinary: Negative for dysuria and hematuria.   Neurological: Negative for dizziness, focal weakness, headaches, light-headedness, loss of balance, vertigo and weakness.   Psychiatric/Behavioral: Negative for altered mental status, depression and substance abuse. The patient is not nervous/anxious.    Allergic/Immunologic: Negative for hives and persistent infections.       Current Outpatient Medications on File Prior to Visit   Medication Sig Dispense Refill    apixaban (ELIQUIS) 5 mg Tab Take 1 tablet (5 mg total) by mouth 2 (two) times daily. 180 tablet 3    ELIQUIS 5 mg Tab TAKE 1 TABLET(5 MG) BY MOUTH TWICE DAILY EVERY 12 HOURS 180 tablet 0    metoprolol succinate (TOPROL-XL) 25 MG 24 hr tablet Take 1 tablet (25 mg total) by mouth once daily. 90 tablet 3     No current facility-administered medications on file prior to visit.        BP (!) 143/79   Pulse 72   Ht 6' (1.829 m)   Wt 68.9 kg (152 lb)   BMI 20.61 kg/m²       Objective:     Physical Exam   Constitutional: He is oriented to person, place, and time. He appears well-developed and well-nourished. He does not appear ill. No distress.   HENT:   Head: Normocephalic and atraumatic.   Nose: Nose normal.   Mouth/Throat: No oropharyngeal exudate.   Eyes: Right eye exhibits no  discharge. Left eye exhibits no discharge. Right conjunctiva has no hemorrhage. Left conjunctiva has no hemorrhage. Right pupil is round. Left pupil is round. Pupils are equal.   Neck: Neck supple. No JVD present. Carotid bruit is not present. No thyromegaly present.   Cardiovascular: Normal rate, S1 normal and S2 normal. An irregularly irregular rhythm present.  No extrasystoles are present. PMI is not displaced. Exam reveals no gallop.   No murmur heard.  Pulses:       Radial pulses are 2+ on the right side, and 2+ on the left side.        Dorsalis pedis pulses are 2+ on the right side, and 2+ on the left side.        Posterior tibial pulses are 2+ on the right side, and 2+ on the left side.   Pulmonary/Chest: Effort normal and breath sounds normal.   Abdominal: Soft. Normal appearance. There is no hepatosplenomegaly. There is no tenderness.   Musculoskeletal:        Right ankle: He exhibits no swelling, no ecchymosis and no deformity.        Left ankle: He exhibits no swelling, no ecchymosis and no deformity.   Lymphadenopathy:        Head (right side): No submandibular adenopathy present.        Head (left side): No submandibular adenopathy present.     He has no cervical adenopathy.   Neurological: He is alert and oriented to person, place, and time. He is not disoriented. No cranial nerve deficit or sensory deficit.   Skin: Skin is warm, dry and intact. No rash noted. He is not diaphoretic.   Psychiatric: He has a normal mood and affect. His speech is normal and behavior is normal. Judgment and thought content normal. Cognition and memory are normal.       Assessment:      1. Permanent atrial fibrillation    2. Chronic anticoagulation        Plan:     1. Atrial Fibrillation   4/17/2015: Diagnosed with essentially asymptomatic paroxysmal atrial fibrillation.    [id number]=6 (A).    5/26/2015: Echo: Normal left ventricular size with mildly depressed systolic function. Normal LA size.   5/29/2015: Holter:  Paroxysmal atrial fibrillation with intermittent atrial fibrillation with well as well as fast VRR. Sinus in between. No symptoms.   1/21/2016: ECG: NSR but on exam appeared to be in atrial fibrillation.   12/8/2016: ECG: Atrial fibrillation with VRR of 75 bpm.   6/12/2017: Holter: Atrial fibrillation 76 () bpm.   Discussed issues at length with patient: Stroke risk 2-3 %/year.   Could possibly consider an ablation. Discussed in detail.   8/8/2018: Began apixiban.   On metoprolol 25 mg Q24.   Rate appears well controlled.      2. Chronic Anticoagulation   4/17/2015: Diagnosed with essentially asymptomatic paroxysmal atrial fibrillation.    [id number]=9 (A).    6/2016: Advised apixiban.   8/8/2018: Began apixiban.   On apixiban 5 mg Q12.   No aspirin or NSAID.   No bleeding.    3. Blood Pressure   12/12/2018: 149/85 mmHg.   4/24/2019: 137/66 mmHg.   Keeping log at home.   Well controlled at home.      4. Primary Care   Dr. Monet Leach    F/u 4 months.    Rickie Gallardo M.D.

## 2020-06-04 ENCOUNTER — OFFICE VISIT (OUTPATIENT)
Dept: CARDIOLOGY | Facility: CLINIC | Age: 70
End: 2020-06-04
Attending: INTERNAL MEDICINE
Payer: MEDICARE

## 2020-06-04 VITALS
WEIGHT: 150 LBS | HEIGHT: 72 IN | BODY MASS INDEX: 20.32 KG/M2 | SYSTOLIC BLOOD PRESSURE: 115 MMHG | DIASTOLIC BLOOD PRESSURE: 70 MMHG | HEART RATE: 73 BPM

## 2020-06-04 DIAGNOSIS — I48.20 CHRONIC ATRIAL FIBRILLATION: ICD-10-CM

## 2020-06-04 DIAGNOSIS — I48.21 PERMANENT ATRIAL FIBRILLATION: ICD-10-CM

## 2020-06-04 DIAGNOSIS — Z79.01 CHRONIC ANTICOAGULATION: ICD-10-CM

## 2020-06-04 PROCEDURE — 99214 OFFICE O/P EST MOD 30 MIN: CPT | Mod: S$GLB,,, | Performed by: INTERNAL MEDICINE

## 2020-06-04 PROCEDURE — 99214 PR OFFICE/OUTPT VISIT, EST, LEVL IV, 30-39 MIN: ICD-10-PCS | Mod: S$GLB,,, | Performed by: INTERNAL MEDICINE

## 2020-06-04 PROCEDURE — 1159F MED LIST DOCD IN RCRD: CPT | Mod: S$GLB,,, | Performed by: INTERNAL MEDICINE

## 2020-06-04 PROCEDURE — 1159F PR MEDICATION LIST DOCUMENTED IN MEDICAL RECORD: ICD-10-PCS | Mod: S$GLB,,, | Performed by: INTERNAL MEDICINE

## 2020-06-04 RX ORDER — METOPROLOL SUCCINATE 25 MG/1
25 TABLET, EXTENDED RELEASE ORAL DAILY
Qty: 90 TABLET | Refills: 3 | Status: SHIPPED | OUTPATIENT
Start: 2020-06-04 | End: 2020-10-01 | Stop reason: SDUPTHER

## 2020-06-04 RX ORDER — VITAMIN E 268 MG
400 CAPSULE ORAL DAILY
COMMUNITY

## 2020-06-04 NOTE — PROGRESS NOTES
Subjective:     Hany Glass is a 69 y.o. male with essentially unremarkable past medical history. He noted to have mild palpitations in about 12/2014. Did not really bother him. He saw his internist on 4/17/2015 and noted to be in atrial fibrillation with well controlled ventricular response rate. He was referred for an Echocardiogram on 5/26/2015 that revealed normal left ventricular size with mildly depressed systolic function. The left atrium was of normal size. On 5/29/2015 he underwent a Holter which revealed paroxysmal atrial fibrillation with intermittent atrial fibrillation with well controlled as well as fast ventricular response rate with activities. There was normal sinus rhythm in between. There were no symptoms during the Holter recording. I saw him on 9/18/2015 and with but with a [id number] of 1 (A) especially in the setting of mild left ventricular dysfunction it was felt he should be anticoagulated and he was advised to start rivaroxaban and stop aspirin. He was given the prescription but decided to stay on the aspirin as he was feeling well and did not have any palpitations. He was feeling well in 1/2016 when I saw him and he again opted to stay on aspirin. On 6/12/2017 he had a Holter which revealed persistent atrial fibrillation 76 () bpm. There has been no weak spells. No exertional chest pain or exertional dyspnea. No bleeding. Feeling well overall.      Atrial Fibrillation   Presents for follow-up visit. Symptoms are negative for bradycardia, chest pain, dizziness, hemodynamic instability, hypertension, hypotension, palpitations, shortness of breath, syncope, tachycardia and weakness. The symptoms have been stable. Past treatments include nothing. Past medical history includes atrial fibrillation.       Review of Systems   Constitution: Negative for chills, fever and malaise/fatigue.   HENT: Negative for nosebleeds.    Eyes: Negative for discharge, vision loss in left eye and  vision loss in right eye.   Cardiovascular: Negative for chest pain, claudication, dyspnea on exertion, irregular heartbeat, leg swelling, near-syncope, orthopnea, palpitations, paroxysmal nocturnal dyspnea and syncope.   Respiratory: Negative for cough, hemoptysis, shortness of breath and wheezing.    Endocrine: Negative for cold intolerance and heat intolerance.   Hematologic/Lymphatic: Negative for bleeding problem. Does not bruise/bleed easily.   Skin: Negative for color change and rash.   Musculoskeletal: Negative for arthritis, falls and myalgias.   Gastrointestinal: Negative for heartburn, hematemesis, hematochezia, hemorrhoids, jaundice, melena, nausea and vomiting.   Genitourinary: Negative for dysuria and hematuria.   Neurological: Negative for dizziness, focal weakness, headaches, light-headedness, loss of balance, vertigo and weakness.   Psychiatric/Behavioral: Negative for altered mental status, depression and substance abuse. The patient is not nervous/anxious.    Allergic/Immunologic: Negative for hives and persistent infections.       Current Outpatient Medications on File Prior to Visit   Medication Sig Dispense Refill    apixaban (ELIQUIS) 5 mg Tab Take 1 tablet (5 mg total) by mouth 2 (two) times daily. 180 tablet 3    metoprolol succinate (TOPROL-XL) 25 MG 24 hr tablet Take 1 tablet (25 mg total) by mouth once daily. 90 tablet 3    vitamin E 400 UNIT capsule Take 400 Units by mouth once daily.       No current facility-administered medications on file prior to visit.        /83   Pulse 73   Ht 6' (1.829 m)   Wt 68 kg (150 lb)   BMI 20.34 kg/m²       Objective:     Physical Exam   Constitutional: He is oriented to person, place, and time. He appears well-developed and well-nourished. He does not appear ill. No distress.   HENT:   Head: Normocephalic and atraumatic.   Nose: Nose normal.   Mouth/Throat: No oropharyngeal exudate.   Eyes: Right eye exhibits no discharge. Left eye exhibits  no discharge. Right conjunctiva has no hemorrhage. Left conjunctiva has no hemorrhage. Right pupil is round. Left pupil is round. Pupils are equal.   Neck: Neck supple. No JVD present. Carotid bruit is not present. No thyromegaly present.   Cardiovascular: Normal rate, S1 normal and S2 normal. An irregularly irregular rhythm present.  No extrasystoles are present. PMI is not displaced. Exam reveals no gallop.   No murmur heard.  Pulses:       Radial pulses are 2+ on the right side, and 2+ on the left side.        Dorsalis pedis pulses are 2+ on the right side, and 2+ on the left side.        Posterior tibial pulses are 2+ on the right side, and 2+ on the left side.   Pulmonary/Chest: Effort normal and breath sounds normal.   Abdominal: Soft. Normal appearance. There is no hepatosplenomegaly. There is no tenderness.   Musculoskeletal:        Right ankle: He exhibits no swelling, no ecchymosis and no deformity.        Left ankle: He exhibits no swelling, no ecchymosis and no deformity.   Lymphadenopathy:        Head (right side): No submandibular adenopathy present.        Head (left side): No submandibular adenopathy present.     He has no cervical adenopathy.   Neurological: He is alert and oriented to person, place, and time. He is not disoriented. No cranial nerve deficit or sensory deficit.   Skin: Skin is warm, dry and intact. No rash noted. He is not diaphoretic.   Psychiatric: He has a normal mood and affect. His speech is normal and behavior is normal. Judgment and thought content normal. Cognition and memory are normal.       Assessment:      1. Permanent atrial fibrillation    2. Chronic anticoagulation        Plan:     1. Atrial Fibrillation   4/17/2015: Diagnosed with essentially asymptomatic paroxysmal atrial fibrillation.    [id number]=8 (A).    5/26/2015: Echo: Normal left ventricular size with mildly depressed systolic function. Normal LA size.   5/29/2015: Holter: Paroxysmal atrial fibrillation with  intermittent atrial fibrillation with well as well as fast VRR. Sinus in between. No symptoms.   1/21/2016: ECG: NSR but on exam appeared to be in atrial fibrillation.   12/8/2016: ECG: Atrial fibrillation with VRR of 75 bpm.   6/12/2017: Holter: Atrial fibrillation 76 () bpm.   Discussed issues at length with patient: Stroke risk 2-3 %/year.   Could possibly consider an ablation. Discussed in detail.   8/8/2018: Began apixiban.   On metoprolol 25 mg Q24.   Rate appears well controlled.      2. Chronic Anticoagulation   4/17/2015: Diagnosed with essentially asymptomatic paroxysmal atrial fibrillation.    [id number]=0 (A).    6/2016: Advised apixiban.   8/8/2018: Began apixiban.   On apixiban 5 mg Q12.   No aspirin or NSAID.   No bleeding.    3. Blood Pressure   12/12/2018: 149/85 mmHg.   4/24/2019: 137/66 mmHg.   Keeping log at home.   Well controlled at home.      4. Primary Care   Dr. Monet Leach    F/u 4 months.    Rickie Gallardo M.D.

## 2020-10-01 ENCOUNTER — OFFICE VISIT (OUTPATIENT)
Dept: CARDIOLOGY | Facility: CLINIC | Age: 70
End: 2020-10-01
Attending: INTERNAL MEDICINE
Payer: MEDICARE

## 2020-10-01 VITALS
BODY MASS INDEX: 20.53 KG/M2 | SYSTOLIC BLOOD PRESSURE: 120 MMHG | WEIGHT: 151.56 LBS | HEIGHT: 72 IN | DIASTOLIC BLOOD PRESSURE: 70 MMHG | HEART RATE: 72 BPM

## 2020-10-01 DIAGNOSIS — I48.21 PERMANENT ATRIAL FIBRILLATION: ICD-10-CM

## 2020-10-01 DIAGNOSIS — Z79.01 CHRONIC ANTICOAGULATION: ICD-10-CM

## 2020-10-01 PROCEDURE — 3008F BODY MASS INDEX DOCD: CPT | Mod: CPTII,S$GLB,, | Performed by: INTERNAL MEDICINE

## 2020-10-01 PROCEDURE — 99214 PR OFFICE/OUTPT VISIT, EST, LEVL IV, 30-39 MIN: ICD-10-PCS | Mod: S$GLB,,, | Performed by: INTERNAL MEDICINE

## 2020-10-01 PROCEDURE — 1159F PR MEDICATION LIST DOCUMENTED IN MEDICAL RECORD: ICD-10-PCS | Mod: S$GLB,,, | Performed by: INTERNAL MEDICINE

## 2020-10-01 PROCEDURE — 1159F MED LIST DOCD IN RCRD: CPT | Mod: S$GLB,,, | Performed by: INTERNAL MEDICINE

## 2020-10-01 PROCEDURE — 99214 OFFICE O/P EST MOD 30 MIN: CPT | Mod: S$GLB,,, | Performed by: INTERNAL MEDICINE

## 2020-10-01 PROCEDURE — 99999 PR PBB SHADOW E&M-EST. PATIENT-LVL III: ICD-10-PCS | Mod: PBBFAC,,, | Performed by: INTERNAL MEDICINE

## 2020-10-01 PROCEDURE — 1126F AMNT PAIN NOTED NONE PRSNT: CPT | Mod: S$GLB,,, | Performed by: INTERNAL MEDICINE

## 2020-10-01 PROCEDURE — 1101F PR PT FALLS ASSESS DOC 0-1 FALLS W/OUT INJ PAST YR: ICD-10-PCS | Mod: CPTII,S$GLB,, | Performed by: INTERNAL MEDICINE

## 2020-10-01 PROCEDURE — 1101F PT FALLS ASSESS-DOCD LE1/YR: CPT | Mod: CPTII,S$GLB,, | Performed by: INTERNAL MEDICINE

## 2020-10-01 PROCEDURE — 99999 PR PBB SHADOW E&M-EST. PATIENT-LVL III: CPT | Mod: PBBFAC,,, | Performed by: INTERNAL MEDICINE

## 2020-10-01 PROCEDURE — 3008F PR BODY MASS INDEX (BMI) DOCUMENTED: ICD-10-PCS | Mod: CPTII,S$GLB,, | Performed by: INTERNAL MEDICINE

## 2020-10-01 PROCEDURE — 1126F PR PAIN SEVERITY QUANTIFIED, NO PAIN PRESENT: ICD-10-PCS | Mod: S$GLB,,, | Performed by: INTERNAL MEDICINE

## 2020-10-01 RX ORDER — METOPROLOL SUCCINATE 25 MG/1
25 TABLET, EXTENDED RELEASE ORAL DAILY
Qty: 90 TABLET | Refills: 3 | Status: SHIPPED | OUTPATIENT
Start: 2020-10-01 | End: 2021-10-19

## 2020-10-01 NOTE — PROGRESS NOTES
Subjective:     Hany Glass is a 70 y.o. male with essentially unremarkable past medical history. He noted to have mild palpitations in about 12/2014. Did not really bother him. He saw his internist on 4/17/2015 and noted to be in atrial fibrillation with well controlled ventricular response rate. He was referred for an Echocardiogram on 5/26/2015 that revealed normal left ventricular size with mildly depressed systolic function. The left atrium was of normal size. On 5/29/2015 he underwent a Holter which revealed paroxysmal atrial fibrillation with intermittent atrial fibrillation with well controlled as well as fast ventricular response rate with activities. There was normal sinus rhythm in between. There were no symptoms during the Holter recording. I saw him on 9/18/2015 and with but with a [id number] of 1 (A) especially in the setting of mild left ventricular dysfunction it was felt he should be anticoagulated and he was advised to start rivaroxaban and stop aspirin. He was given the prescription but decided to stay on the aspirin as he was feeling well and did not have any palpitations. He was feeling well in 1/2016 when I saw him and he again opted to stay on aspirin. On 6/12/2017 he had a Holter which revealed persistent atrial fibrillation 76 () bpm. There has been no weak spells. No exertional chest pain or exertional dyspnea. No bleeding. Feeling well overall.      Atrial Fibrillation  Presents for follow-up visit. Symptoms are negative for bradycardia, chest pain, dizziness, hemodynamic instability, hypertension, hypotension, palpitations, shortness of breath, syncope, tachycardia and weakness. The symptoms have been stable. Past treatments include nothing. Past medical history includes atrial fibrillation.       Review of Systems   Constitution: Negative for chills, fever and malaise/fatigue.   HENT: Negative for nosebleeds.    Eyes: Negative for discharge, vision loss in left eye and  vision loss in right eye.   Cardiovascular: Negative for chest pain, claudication, dyspnea on exertion, irregular heartbeat, leg swelling, near-syncope, orthopnea, palpitations, paroxysmal nocturnal dyspnea and syncope.   Respiratory: Negative for cough, hemoptysis, shortness of breath and wheezing.    Endocrine: Negative for cold intolerance and heat intolerance.   Hematologic/Lymphatic: Negative for bleeding problem. Does not bruise/bleed easily.   Skin: Negative for color change and rash.   Musculoskeletal: Negative for arthritis, falls and myalgias.   Gastrointestinal: Negative for heartburn, hematemesis, hematochezia, hemorrhoids, jaundice, melena, nausea and vomiting.   Genitourinary: Negative for dysuria and hematuria.   Neurological: Negative for dizziness, focal weakness, headaches, light-headedness, loss of balance, vertigo and weakness.   Psychiatric/Behavioral: Negative for altered mental status, depression and substance abuse. The patient is not nervous/anxious.    Allergic/Immunologic: Negative for hives and persistent infections.       Current Outpatient Medications on File Prior to Visit   Medication Sig Dispense Refill    ELIQUIS 5 mg Tab TAKE 1 TABLET(5 MG) BY MOUTH TWICE DAILY EVERY 12 HOURS 180 tablet 3    metoprolol succinate (TOPROL-XL) 25 MG 24 hr tablet Take 1 tablet (25 mg total) by mouth once daily. 90 tablet 3    vitamin E 400 UNIT capsule Take 400 Units by mouth once daily.       No current facility-administered medications on file prior to visit.        /70 Comment: Average at home  Pulse 72   Ht 6' (1.829 m)   Wt 68.8 kg (151 lb 9.1 oz)   BMI 20.56 kg/m²       Objective:     Physical Exam   Constitutional: He is oriented to person, place, and time. He appears well-developed and well-nourished. He does not appear ill. No distress.   HENT:   Head: Normocephalic and atraumatic.   Nose: Nose normal.   Mouth/Throat: No oropharyngeal exudate.   Eyes: Right eye exhibits no  discharge. Left eye exhibits no discharge. Right conjunctiva has no hemorrhage. Left conjunctiva has no hemorrhage. Right pupil is round. Left pupil is round. Pupils are equal.   Neck: Neck supple. No JVD present. Carotid bruit is not present. No thyromegaly present.   Cardiovascular: Normal rate, S1 normal and S2 normal. An irregularly irregular rhythm present. PMI is not displaced. Exam reveals no gallop.   No murmur heard.  Pulses:       Radial pulses are 2+ on the right side and 2+ on the left side.        Dorsalis pedis pulses are 2+ on the right side and 2+ on the left side.        Posterior tibial pulses are 2+ on the right side and 2+ on the left side.   Pulmonary/Chest: Effort normal and breath sounds normal.   Abdominal: Soft. Normal appearance. There is no hepatosplenomegaly. There is no abdominal tenderness.   Musculoskeletal:      Right ankle: He exhibits no swelling, no ecchymosis and no deformity.      Left ankle: He exhibits no swelling, no ecchymosis and no deformity.   Lymphadenopathy:        Head (right side): No submandibular adenopathy present.        Head (left side): No submandibular adenopathy present.     He has no cervical adenopathy.   Neurological: He is alert and oriented to person, place, and time. He is not disoriented. No cranial nerve deficit or sensory deficit.   Skin: Skin is warm, dry and intact. No rash noted. He is not diaphoretic.   Psychiatric: He has a normal mood and affect. His speech is normal and behavior is normal. Judgment and thought content normal. Cognition and memory are normal.       Assessment:      1. Permanent atrial fibrillation    2. Chronic anticoagulation        Plan:     1. Atrial Fibrillation   4/17/2015: Diagnosed with essentially asymptomatic paroxysmal atrial fibrillation.    [id number]=7 (A).    5/26/2015: Echo: Normal left ventricular size with mildly depressed systolic function. Normal LA size.   5/29/2015: Holter: Paroxysmal atrial fibrillation  with intermittent atrial fibrillation with well as well as fast VRR. Sinus in between. No symptoms.   1/21/2016: ECG: NSR but on exam appeared to be in atrial fibrillation.   12/8/2016: ECG: Atrial fibrillation with VRR of 75 bpm.   6/12/2017: Holter: Atrial fibrillation 76 () bpm.   Discussed issues at length with patient: Stroke risk 2-3 %/year.   Could possibly consider an ablation. Discussed in detail.   8/8/2018: Began apixiban.   On metoprolol 25 mg Q24.   Rate appears well controlled.      2. Chronic Anticoagulation   4/17/2015: Diagnosed with essentially asymptomatic paroxysmal atrial fibrillation.    [id number]=6 (A).    6/2016: Advised apixiban.   8/8/2018: Began apixiban.   On apixiban 5 mg Q12.   No aspirin or NSAID.   No bleeding.    3. Blood Pressure   12/12/2018: 149/85 mmHg.   4/24/2019: 137/66 mmHg.   10/1/2020: 152/83 mmHg.   On metoprolol 25 mg Q24.   Keeping log at home.   Well controlled at home.      4. Primary Care   Dr. Monet Leach    F/u 6 months.    Rickie Gallardo M.D.

## 2021-02-04 ENCOUNTER — OFFICE VISIT (OUTPATIENT)
Dept: CARDIOLOGY | Facility: CLINIC | Age: 71
End: 2021-02-04
Attending: INTERNAL MEDICINE
Payer: MEDICARE

## 2021-02-04 VITALS
DIASTOLIC BLOOD PRESSURE: 70 MMHG | BODY MASS INDEX: 21.16 KG/M2 | WEIGHT: 156.19 LBS | SYSTOLIC BLOOD PRESSURE: 134 MMHG | HEIGHT: 72 IN | HEART RATE: 70 BPM

## 2021-02-04 DIAGNOSIS — Z79.01 CHRONIC ANTICOAGULATION: ICD-10-CM

## 2021-02-04 DIAGNOSIS — I48.21 PERMANENT ATRIAL FIBRILLATION: ICD-10-CM

## 2021-02-04 PROCEDURE — 1126F AMNT PAIN NOTED NONE PRSNT: CPT | Mod: S$GLB,,, | Performed by: INTERNAL MEDICINE

## 2021-02-04 PROCEDURE — 99999 PR PBB SHADOW E&M-EST. PATIENT-LVL III: CPT | Mod: PBBFAC,,, | Performed by: INTERNAL MEDICINE

## 2021-02-04 PROCEDURE — 99214 PR OFFICE/OUTPT VISIT, EST, LEVL IV, 30-39 MIN: ICD-10-PCS | Mod: S$GLB,,, | Performed by: INTERNAL MEDICINE

## 2021-02-04 PROCEDURE — 1159F PR MEDICATION LIST DOCUMENTED IN MEDICAL RECORD: ICD-10-PCS | Mod: S$GLB,,, | Performed by: INTERNAL MEDICINE

## 2021-02-04 PROCEDURE — 3008F BODY MASS INDEX DOCD: CPT | Mod: CPTII,S$GLB,, | Performed by: INTERNAL MEDICINE

## 2021-02-04 PROCEDURE — 1159F MED LIST DOCD IN RCRD: CPT | Mod: S$GLB,,, | Performed by: INTERNAL MEDICINE

## 2021-02-04 PROCEDURE — 99999 PR PBB SHADOW E&M-EST. PATIENT-LVL III: ICD-10-PCS | Mod: PBBFAC,,, | Performed by: INTERNAL MEDICINE

## 2021-02-04 PROCEDURE — 3008F PR BODY MASS INDEX (BMI) DOCUMENTED: ICD-10-PCS | Mod: CPTII,S$GLB,, | Performed by: INTERNAL MEDICINE

## 2021-02-04 PROCEDURE — 1126F PR PAIN SEVERITY QUANTIFIED, NO PAIN PRESENT: ICD-10-PCS | Mod: S$GLB,,, | Performed by: INTERNAL MEDICINE

## 2021-02-04 PROCEDURE — 99214 OFFICE O/P EST MOD 30 MIN: CPT | Mod: S$GLB,,, | Performed by: INTERNAL MEDICINE

## 2021-04-29 DIAGNOSIS — I48.21 PERMANENT ATRIAL FIBRILLATION: ICD-10-CM

## 2021-04-29 DIAGNOSIS — Z79.01 CHRONIC ANTICOAGULATION: ICD-10-CM

## 2021-04-29 RX ORDER — APIXABAN 5 MG/1
TABLET, FILM COATED ORAL
Qty: 166 TABLET | Refills: 0 | Status: SHIPPED | OUTPATIENT
Start: 2021-04-29 | End: 2021-04-29 | Stop reason: SDUPTHER

## 2021-06-03 ENCOUNTER — OFFICE VISIT (OUTPATIENT)
Dept: CARDIOLOGY | Facility: CLINIC | Age: 71
End: 2021-06-03
Attending: INTERNAL MEDICINE
Payer: MEDICARE

## 2021-06-03 VITALS
OXYGEN SATURATION: 97 % | SYSTOLIC BLOOD PRESSURE: 125 MMHG | HEIGHT: 72 IN | WEIGHT: 155.56 LBS | BODY MASS INDEX: 21.07 KG/M2 | DIASTOLIC BLOOD PRESSURE: 75 MMHG | HEART RATE: 73 BPM

## 2021-06-03 DIAGNOSIS — Z79.01 CHRONIC ANTICOAGULATION: ICD-10-CM

## 2021-06-03 DIAGNOSIS — I48.21 PERMANENT ATRIAL FIBRILLATION: ICD-10-CM

## 2021-06-03 PROCEDURE — 3288F FALL RISK ASSESSMENT DOCD: CPT | Mod: CPTII,S$GLB,, | Performed by: INTERNAL MEDICINE

## 2021-06-03 PROCEDURE — 3008F PR BODY MASS INDEX (BMI) DOCUMENTED: ICD-10-PCS | Mod: CPTII,S$GLB,, | Performed by: INTERNAL MEDICINE

## 2021-06-03 PROCEDURE — 93010 ELECTROCARDIOGRAM REPORT: CPT | Mod: S$GLB,,, | Performed by: INTERNAL MEDICINE

## 2021-06-03 PROCEDURE — 99214 OFFICE O/P EST MOD 30 MIN: CPT | Mod: S$GLB,,, | Performed by: INTERNAL MEDICINE

## 2021-06-03 PROCEDURE — 1126F AMNT PAIN NOTED NONE PRSNT: CPT | Mod: S$GLB,,, | Performed by: INTERNAL MEDICINE

## 2021-06-03 PROCEDURE — 93005 ELECTROCARDIOGRAM TRACING: CPT

## 2021-06-03 PROCEDURE — 99214 PR OFFICE/OUTPT VISIT, EST, LEVL IV, 30-39 MIN: ICD-10-PCS | Mod: S$GLB,,, | Performed by: INTERNAL MEDICINE

## 2021-06-03 PROCEDURE — 1159F PR MEDICATION LIST DOCUMENTED IN MEDICAL RECORD: ICD-10-PCS | Mod: S$GLB,,, | Performed by: INTERNAL MEDICINE

## 2021-06-03 PROCEDURE — 3008F BODY MASS INDEX DOCD: CPT | Mod: CPTII,S$GLB,, | Performed by: INTERNAL MEDICINE

## 2021-06-03 PROCEDURE — 99999 PR PBB SHADOW E&M-EST. PATIENT-LVL III: CPT | Mod: PBBFAC,,, | Performed by: INTERNAL MEDICINE

## 2021-06-03 PROCEDURE — 93010 EKG 12-LEAD: ICD-10-PCS | Mod: S$GLB,,, | Performed by: INTERNAL MEDICINE

## 2021-06-03 PROCEDURE — 1101F PR PT FALLS ASSESS DOC 0-1 FALLS W/OUT INJ PAST YR: ICD-10-PCS | Mod: CPTII,S$GLB,, | Performed by: INTERNAL MEDICINE

## 2021-06-03 PROCEDURE — 3288F PR FALLS RISK ASSESSMENT DOCUMENTED: ICD-10-PCS | Mod: CPTII,S$GLB,, | Performed by: INTERNAL MEDICINE

## 2021-06-03 PROCEDURE — 99999 PR PBB SHADOW E&M-EST. PATIENT-LVL III: ICD-10-PCS | Mod: PBBFAC,,, | Performed by: INTERNAL MEDICINE

## 2021-06-03 PROCEDURE — 1101F PT FALLS ASSESS-DOCD LE1/YR: CPT | Mod: CPTII,S$GLB,, | Performed by: INTERNAL MEDICINE

## 2021-06-03 PROCEDURE — 1126F PR PAIN SEVERITY QUANTIFIED, NO PAIN PRESENT: ICD-10-PCS | Mod: S$GLB,,, | Performed by: INTERNAL MEDICINE

## 2021-06-03 PROCEDURE — 1159F MED LIST DOCD IN RCRD: CPT | Mod: S$GLB,,, | Performed by: INTERNAL MEDICINE

## 2021-10-19 ENCOUNTER — OFFICE VISIT (OUTPATIENT)
Dept: CARDIOLOGY | Facility: CLINIC | Age: 71
End: 2021-10-19
Attending: INTERNAL MEDICINE
Payer: MEDICARE

## 2021-10-19 ENCOUNTER — PATIENT MESSAGE (OUTPATIENT)
Dept: CARDIOLOGY | Facility: CLINIC | Age: 71
End: 2021-10-19

## 2021-10-19 VITALS
OXYGEN SATURATION: 98 % | BODY MASS INDEX: 20.6 KG/M2 | SYSTOLIC BLOOD PRESSURE: 145 MMHG | HEIGHT: 72 IN | HEART RATE: 86 BPM | WEIGHT: 152.13 LBS | DIASTOLIC BLOOD PRESSURE: 75 MMHG

## 2021-10-19 DIAGNOSIS — Z79.01 CHRONIC ANTICOAGULATION: ICD-10-CM

## 2021-10-19 DIAGNOSIS — I48.21 PERMANENT ATRIAL FIBRILLATION: ICD-10-CM

## 2021-10-19 DIAGNOSIS — I10 PRIMARY HYPERTENSION: ICD-10-CM

## 2021-10-19 PROCEDURE — 3288F FALL RISK ASSESSMENT DOCD: CPT | Mod: CPTII,S$GLB,, | Performed by: INTERNAL MEDICINE

## 2021-10-19 PROCEDURE — 99999 PR PBB SHADOW E&M-EST. PATIENT-LVL III: CPT | Mod: PBBFAC,,, | Performed by: INTERNAL MEDICINE

## 2021-10-19 PROCEDURE — 3077F PR MOST RECENT SYSTOLIC BLOOD PRESSURE >= 140 MM HG: ICD-10-PCS | Mod: CPTII,S$GLB,, | Performed by: INTERNAL MEDICINE

## 2021-10-19 PROCEDURE — 93000 PR ELECTROCARDIOGRAM, COMPLETE: ICD-10-PCS | Mod: S$GLB,,, | Performed by: INTERNAL MEDICINE

## 2021-10-19 PROCEDURE — 1101F PR PT FALLS ASSESS DOC 0-1 FALLS W/OUT INJ PAST YR: ICD-10-PCS | Mod: CPTII,S$GLB,, | Performed by: INTERNAL MEDICINE

## 2021-10-19 PROCEDURE — 3288F PR FALLS RISK ASSESSMENT DOCUMENTED: ICD-10-PCS | Mod: CPTII,S$GLB,, | Performed by: INTERNAL MEDICINE

## 2021-10-19 PROCEDURE — 93005 ELECTROCARDIOGRAM TRACING: CPT

## 2021-10-19 PROCEDURE — 99999 PR PBB SHADOW E&M-EST. PATIENT-LVL III: ICD-10-PCS | Mod: PBBFAC,,, | Performed by: INTERNAL MEDICINE

## 2021-10-19 PROCEDURE — 1160F RVW MEDS BY RX/DR IN RCRD: CPT | Mod: CPTII,S$GLB,, | Performed by: INTERNAL MEDICINE

## 2021-10-19 PROCEDURE — 1159F PR MEDICATION LIST DOCUMENTED IN MEDICAL RECORD: ICD-10-PCS | Mod: CPTII,S$GLB,, | Performed by: INTERNAL MEDICINE

## 2021-10-19 PROCEDURE — 3008F BODY MASS INDEX DOCD: CPT | Mod: CPTII,S$GLB,, | Performed by: INTERNAL MEDICINE

## 2021-10-19 PROCEDURE — 1126F AMNT PAIN NOTED NONE PRSNT: CPT | Mod: CPTII,S$GLB,, | Performed by: INTERNAL MEDICINE

## 2021-10-19 PROCEDURE — 1159F MED LIST DOCD IN RCRD: CPT | Mod: CPTII,S$GLB,, | Performed by: INTERNAL MEDICINE

## 2021-10-19 PROCEDURE — 3078F PR MOST RECENT DIASTOLIC BLOOD PRESSURE < 80 MM HG: ICD-10-PCS | Mod: CPTII,S$GLB,, | Performed by: INTERNAL MEDICINE

## 2021-10-19 PROCEDURE — 1160F PR REVIEW ALL MEDS BY PRESCRIBER/CLIN PHARMACIST DOCUMENTED: ICD-10-PCS | Mod: CPTII,S$GLB,, | Performed by: INTERNAL MEDICINE

## 2021-10-19 PROCEDURE — 1126F PR PAIN SEVERITY QUANTIFIED, NO PAIN PRESENT: ICD-10-PCS | Mod: CPTII,S$GLB,, | Performed by: INTERNAL MEDICINE

## 2021-10-19 PROCEDURE — 93000 ELECTROCARDIOGRAM COMPLETE: CPT | Mod: S$GLB,,, | Performed by: INTERNAL MEDICINE

## 2021-10-19 PROCEDURE — 3078F DIAST BP <80 MM HG: CPT | Mod: CPTII,S$GLB,, | Performed by: INTERNAL MEDICINE

## 2021-10-19 PROCEDURE — 1101F PT FALLS ASSESS-DOCD LE1/YR: CPT | Mod: CPTII,S$GLB,, | Performed by: INTERNAL MEDICINE

## 2021-10-19 PROCEDURE — 99214 OFFICE O/P EST MOD 30 MIN: CPT | Mod: 25,S$GLB,, | Performed by: INTERNAL MEDICINE

## 2021-10-19 PROCEDURE — 99214 PR OFFICE/OUTPT VISIT, EST, LEVL IV, 30-39 MIN: ICD-10-PCS | Mod: 25,S$GLB,, | Performed by: INTERNAL MEDICINE

## 2021-10-19 PROCEDURE — 3008F PR BODY MASS INDEX (BMI) DOCUMENTED: ICD-10-PCS | Mod: CPTII,S$GLB,, | Performed by: INTERNAL MEDICINE

## 2021-10-19 PROCEDURE — 3077F SYST BP >= 140 MM HG: CPT | Mod: CPTII,S$GLB,, | Performed by: INTERNAL MEDICINE

## 2021-10-19 RX ORDER — LOSARTAN POTASSIUM 50 MG/1
50 TABLET ORAL DAILY
Qty: 90 TABLET | Refills: 3 | Status: SHIPPED | OUTPATIENT
Start: 2021-10-19 | End: 2022-12-27 | Stop reason: SDUPTHER

## 2021-10-20 ENCOUNTER — TELEPHONE (OUTPATIENT)
Dept: CARDIOLOGY | Facility: CLINIC | Age: 71
End: 2021-10-20

## 2022-02-03 DIAGNOSIS — I48.21 PERMANENT ATRIAL FIBRILLATION: ICD-10-CM

## 2022-02-03 DIAGNOSIS — Z79.01 CHRONIC ANTICOAGULATION: ICD-10-CM

## 2022-02-03 NOTE — TELEPHONE ENCOUNTER
----- Message from Faheem Winn sent at 2/3/2022  9:55 AM CST -----  Regardin day Script Request  Contact: Flora ([facility])  Type:  RX Refill Request    Who Called: Flora ([facility])    Refill or New Rx:Refill    RX Name and Strength: ELIQUIS 5 mg Tab    How is the patient currently taking it? (ex. 1XDay):     Is this a 30 day or 90 day RX: 90 days    Preferred Pharmacy with phone number: [facility] - [place], [place] - [address] [facility] [address]    Local or Mail Order: Local    Ordering Provider:    Would the patient rather a call back or a response via [facility]?     Best Call Back Number: [phone]    Additional Information:

## 2022-02-08 ENCOUNTER — OFFICE VISIT (OUTPATIENT)
Dept: CARDIOLOGY | Facility: CLINIC | Age: 72
End: 2022-02-08
Attending: INTERNAL MEDICINE
Payer: MEDICARE

## 2022-02-08 VITALS
DIASTOLIC BLOOD PRESSURE: 65 MMHG | BODY MASS INDEX: 21.13 KG/M2 | OXYGEN SATURATION: 98 % | SYSTOLIC BLOOD PRESSURE: 120 MMHG | WEIGHT: 156 LBS | HEIGHT: 72 IN | HEART RATE: 64 BPM

## 2022-02-08 DIAGNOSIS — I10 PRIMARY HYPERTENSION: ICD-10-CM

## 2022-02-08 DIAGNOSIS — Z79.01 CHRONIC ANTICOAGULATION: ICD-10-CM

## 2022-02-08 DIAGNOSIS — I48.21 PERMANENT ATRIAL FIBRILLATION: ICD-10-CM

## 2022-02-08 PROCEDURE — 1160F PR REVIEW ALL MEDS BY PRESCRIBER/CLIN PHARMACIST DOCUMENTED: ICD-10-PCS | Mod: CPTII,S$GLB,, | Performed by: INTERNAL MEDICINE

## 2022-02-08 PROCEDURE — 1159F MED LIST DOCD IN RCRD: CPT | Mod: CPTII,S$GLB,, | Performed by: INTERNAL MEDICINE

## 2022-02-08 PROCEDURE — 1101F PR PT FALLS ASSESS DOC 0-1 FALLS W/OUT INJ PAST YR: ICD-10-PCS | Mod: CPTII,S$GLB,, | Performed by: INTERNAL MEDICINE

## 2022-02-08 PROCEDURE — 3008F PR BODY MASS INDEX (BMI) DOCUMENTED: ICD-10-PCS | Mod: CPTII,S$GLB,, | Performed by: INTERNAL MEDICINE

## 2022-02-08 PROCEDURE — 99214 PR OFFICE/OUTPT VISIT, EST, LEVL IV, 30-39 MIN: ICD-10-PCS | Mod: S$GLB,,, | Performed by: INTERNAL MEDICINE

## 2022-02-08 PROCEDURE — 1160F RVW MEDS BY RX/DR IN RCRD: CPT | Mod: CPTII,S$GLB,, | Performed by: INTERNAL MEDICINE

## 2022-02-08 PROCEDURE — 99999 PR PBB SHADOW E&M-EST. PATIENT-LVL III: ICD-10-PCS | Mod: PBBFAC,,, | Performed by: INTERNAL MEDICINE

## 2022-02-08 PROCEDURE — 3008F BODY MASS INDEX DOCD: CPT | Mod: CPTII,S$GLB,, | Performed by: INTERNAL MEDICINE

## 2022-02-08 PROCEDURE — 3074F PR MOST RECENT SYSTOLIC BLOOD PRESSURE < 130 MM HG: ICD-10-PCS | Mod: CPTII,S$GLB,, | Performed by: INTERNAL MEDICINE

## 2022-02-08 PROCEDURE — 3078F PR MOST RECENT DIASTOLIC BLOOD PRESSURE < 80 MM HG: ICD-10-PCS | Mod: CPTII,S$GLB,, | Performed by: INTERNAL MEDICINE

## 2022-02-08 PROCEDURE — 3288F FALL RISK ASSESSMENT DOCD: CPT | Mod: CPTII,S$GLB,, | Performed by: INTERNAL MEDICINE

## 2022-02-08 PROCEDURE — 99214 OFFICE O/P EST MOD 30 MIN: CPT | Mod: S$GLB,,, | Performed by: INTERNAL MEDICINE

## 2022-02-08 PROCEDURE — 1159F PR MEDICATION LIST DOCUMENTED IN MEDICAL RECORD: ICD-10-PCS | Mod: CPTII,S$GLB,, | Performed by: INTERNAL MEDICINE

## 2022-02-08 PROCEDURE — 1126F AMNT PAIN NOTED NONE PRSNT: CPT | Mod: CPTII,S$GLB,, | Performed by: INTERNAL MEDICINE

## 2022-02-08 PROCEDURE — 3078F DIAST BP <80 MM HG: CPT | Mod: CPTII,S$GLB,, | Performed by: INTERNAL MEDICINE

## 2022-02-08 PROCEDURE — 3288F PR FALLS RISK ASSESSMENT DOCUMENTED: ICD-10-PCS | Mod: CPTII,S$GLB,, | Performed by: INTERNAL MEDICINE

## 2022-02-08 PROCEDURE — 99999 PR PBB SHADOW E&M-EST. PATIENT-LVL III: CPT | Mod: PBBFAC,,, | Performed by: INTERNAL MEDICINE

## 2022-02-08 PROCEDURE — 1101F PT FALLS ASSESS-DOCD LE1/YR: CPT | Mod: CPTII,S$GLB,, | Performed by: INTERNAL MEDICINE

## 2022-02-08 PROCEDURE — 1126F PR PAIN SEVERITY QUANTIFIED, NO PAIN PRESENT: ICD-10-PCS | Mod: CPTII,S$GLB,, | Performed by: INTERNAL MEDICINE

## 2022-02-08 PROCEDURE — 3074F SYST BP LT 130 MM HG: CPT | Mod: CPTII,S$GLB,, | Performed by: INTERNAL MEDICINE

## 2022-02-08 NOTE — PROGRESS NOTES
Subjective:     Hany Glass is a 71 y.o. male with hypertension. He has a heathy weight. He noted mild palpitations in about 12/2014. The palpitations did not really bother him. He saw his internist on 4/17/2015 and was noted to be in atrial fibrillation with well controlled ventricular response rate. He was referred for an echocardiogram on 5/26/201. The echocardiogram revealed normal left ventricular size with mildly depressed systolic function. The left atrium was of normal size. On 5/29/2015 he underwent a holter which revealed paroxysmal atrial fibrillation with intermittent atrial fibrillation with well controlled as well as fast ventricular response rate with activities. There was normal sinus rhythm in between.  He reported no symptoms during the holter recording. I saw him on 9/18/2015 and with a [id number] score of 1 (A). In the setting of mild left ventricular dysfunction it was felt he should be anticoagulated and he was advised to begin rivaroxaban and stop aspirin. He was given the prescription but decided to stay on the aspirin as he was feeling well and did not have any palpitations. He was feeling well in 1/2016 when I saw him and he again opted to stay on aspirin. On 6/12/2017 he had a holter which revealed persistent atrial fibrillation 76 () bpm. In 10/2021 it appeared clear that he had hypertension. No exertional chest pain or exertional dyspnea. No palpitations or weak spells. No bleeding. No issues with any of his prescribed medications. Feeling well overall.      Atrial Fibrillation  Presents for follow-up visit. Symptoms are negative for bradycardia, chest pain, dizziness, hemodynamic instability, hypertension, hypotension, palpitations, shortness of breath, syncope, tachycardia and weakness. The symptoms have been stable. Past treatments include nothing. Past medical history includes atrial fibrillation.   Hypertension  This is a chronic problem. The problem is unchanged. The  problem is controlled (usually 115-125/60-70 mmHg at home). Pertinent negatives include no anxiety, blurred vision, chest pain, headaches, malaise/fatigue, neck pain, orthopnea, palpitations, PND, shortness of breath or sweats.       Review of Systems   Constitutional: Negative for chills, fever and malaise/fatigue.   HENT: Negative for nosebleeds.    Eyes: Negative for blurred vision, discharge, vision loss in left eye and vision loss in right eye.   Cardiovascular: Negative for chest pain, claudication, dyspnea on exertion, irregular heartbeat, leg swelling, near-syncope, orthopnea, palpitations, paroxysmal nocturnal dyspnea and syncope.   Respiratory: Negative for cough, hemoptysis, shortness of breath and wheezing.    Endocrine: Negative for cold intolerance and heat intolerance.   Hematologic/Lymphatic: Negative for bleeding problem. Does not bruise/bleed easily.   Skin: Negative for color change and rash.   Musculoskeletal: Negative for arthritis, falls, myalgias and neck pain.   Gastrointestinal: Negative for heartburn, hematemesis, hematochezia, hemorrhoids, jaundice, melena, nausea and vomiting.   Genitourinary: Negative for dysuria and hematuria.   Neurological: Negative for dizziness, focal weakness, headaches, light-headedness, loss of balance, vertigo and weakness.   Psychiatric/Behavioral: Negative for altered mental status, depression and substance abuse. The patient is not nervous/anxious.    Allergic/Immunologic: Negative for hives and persistent infections.       Current Outpatient Medications on File Prior to Visit   Medication Sig Dispense Refill    apixaban (ELIQUIS) 5 mg Tab Take 1 tablet (5 mg total) by mouth 2 (two) times daily. 180 tablet 3    losartan (COZAAR) 50 MG tablet Take 1 tablet (50 mg total) by mouth once daily. 90 tablet 3    vitamin E 400 UNIT capsule Take 400 Units by mouth once daily.       No current facility-administered medications on file prior to visit.       /65  Comment: average at home  Pulse 64   Ht 6' (1.829 m)   Wt 70.8 kg (155 lb 15.6 oz)   SpO2 98%   BMI 21.15 kg/m²       Objective:     Physical Exam  Constitutional:       General: He is not in acute distress.     Appearance: Normal appearance. He is well-developed. He is not ill-appearing or diaphoretic.   HENT:      Head: Normocephalic and atraumatic.      Nose: Nose normal.      Mouth/Throat:      Pharynx: No oropharyngeal exudate.   Eyes:      General:         Right eye: No discharge.         Left eye: No discharge.      Conjunctiva/sclera:      Right eye: No hemorrhage.     Left eye: No hemorrhage.     Pupils: Pupils are equal.      Right eye: Pupil is round.      Left eye: Pupil is round.   Neck:      Thyroid: No thyromegaly.      Vascular: No carotid bruit or JVD.   Cardiovascular:      Rate and Rhythm: Normal rate. Rhythm irregularly irregular.      Chest Wall: PMI is not displaced.      Pulses:           Radial pulses are 2+ on the right side and 2+ on the left side.        Dorsalis pedis pulses are 2+ on the right side and 2+ on the left side.        Posterior tibial pulses are 2+ on the right side and 2+ on the left side.      Heart sounds: S1 normal and S2 normal. No murmur heard.  No gallop.    Pulmonary:      Effort: Pulmonary effort is normal.      Breath sounds: Normal breath sounds.   Abdominal:      Palpations: Abdomen is soft.      Tenderness: There is no abdominal tenderness.   Musculoskeletal:      Cervical back: Neck supple.      Right ankle: No swelling, deformity or ecchymosis.      Left ankle: No swelling, deformity or ecchymosis.   Lymphadenopathy:      Head:      Right side of head: No submandibular adenopathy.      Left side of head: No submandibular adenopathy.      Cervical: No cervical adenopathy.   Skin:     General: Skin is warm and dry.      Findings: No rash.   Neurological:      General: No focal deficit present.      Mental Status: He is alert and oriented to person, place,  and time. He is not disoriented.      Cranial Nerves: No cranial nerve deficit.      Sensory: No sensory deficit.   Psychiatric:         Attention and Perception: Attention and perception normal.         Mood and Affect: Mood and affect normal.         Speech: Speech normal.         Behavior: Behavior normal.         Thought Content: Thought content normal.         Cognition and Memory: Cognition normal.         Judgment: Judgment normal.         Assessment:      1. Permanent atrial fibrillation    2. Chronic anticoagulation    3. Primary hypertension        Plan:     1. Atrial Fibrillation   4/17/2015: Diagnosed with essentially asymptomatic paroxysmal atrial fibrillation.    [id number]=5 (HA).    5/26/2015: Echo: Normal left ventricular size with mildly depressed systolic function. Normal LA size.   5/29/2015: Holter: Paroxysmal atrial fibrillation with intermittent atrial fibrillation with well as well as fast VRR. Sinus in between. No symptoms.   1/21/2016: ECG: NSR but on exam appeared to be in atrial fibrillation.   12/8/2016: ECG: Atrial fibrillation with VRR of 75 bpm.   6/12/2017: Holter: Atrial fibrillation 76 () bpm.   8/8/2018: Began apixiban.   6/3/2021:  Metoprolol 25 mg Q24 was discontinued as rate appeared on slightly slow side.   Discussed issues at length with patient: Stroke risk 2-3 %/year.   VRR appears well controlled with no AV slowing agent.   Essentially asymptomatic.        2. Chronic Anticoagulation   4/17/2015: Diagnosed with essentially asymptomatic paroxysmal atrial fibrillation.    [id number]=5 (A).    6/2016: Apixiban was advised but not begun.   8/8/2018: Apixiban was begun.   On apixiban 5 mg Q12.   No aspirin or NSAID.   No bleeding.    3. Hypertension   12/12/2018: 149/85 mmHg.   4/24/2019: 137/66 mmHg.   10/1/2020: 152/83 mmHg.   10/2021: Diagnosed.   10/19/2021: Losartan 50 mg Q24 was begun.   On losartan 50 mg Q24.   Keeping log at home.   Well controlled at  home.      4. Primary Care   Dr. Monet Leach.    F/u 4 months.    Rickie Gallardo M.D.

## 2022-06-07 ENCOUNTER — OFFICE VISIT (OUTPATIENT)
Dept: CARDIOLOGY | Facility: CLINIC | Age: 72
End: 2022-06-07
Attending: INTERNAL MEDICINE
Payer: MEDICARE

## 2022-06-07 VITALS
HEART RATE: 76 BPM | WEIGHT: 152.44 LBS | SYSTOLIC BLOOD PRESSURE: 140 MMHG | BODY MASS INDEX: 20.65 KG/M2 | DIASTOLIC BLOOD PRESSURE: 72 MMHG | OXYGEN SATURATION: 98 % | HEIGHT: 72 IN

## 2022-06-07 DIAGNOSIS — Z79.01 CHRONIC ANTICOAGULATION: ICD-10-CM

## 2022-06-07 DIAGNOSIS — I48.21 PERMANENT ATRIAL FIBRILLATION: ICD-10-CM

## 2022-06-07 DIAGNOSIS — I10 PRIMARY HYPERTENSION: ICD-10-CM

## 2022-06-07 PROCEDURE — 3288F FALL RISK ASSESSMENT DOCD: CPT | Mod: CPTII,S$GLB,, | Performed by: INTERNAL MEDICINE

## 2022-06-07 PROCEDURE — 1159F PR MEDICATION LIST DOCUMENTED IN MEDICAL RECORD: ICD-10-PCS | Mod: CPTII,S$GLB,, | Performed by: INTERNAL MEDICINE

## 2022-06-07 PROCEDURE — 3077F PR MOST RECENT SYSTOLIC BLOOD PRESSURE >= 140 MM HG: ICD-10-PCS | Mod: CPTII,S$GLB,, | Performed by: INTERNAL MEDICINE

## 2022-06-07 PROCEDURE — 3077F SYST BP >= 140 MM HG: CPT | Mod: CPTII,S$GLB,, | Performed by: INTERNAL MEDICINE

## 2022-06-07 PROCEDURE — 99214 PR OFFICE/OUTPT VISIT, EST, LEVL IV, 30-39 MIN: ICD-10-PCS | Mod: S$GLB,,, | Performed by: INTERNAL MEDICINE

## 2022-06-07 PROCEDURE — 3078F DIAST BP <80 MM HG: CPT | Mod: CPTII,S$GLB,, | Performed by: INTERNAL MEDICINE

## 2022-06-07 PROCEDURE — 4010F ACE/ARB THERAPY RXD/TAKEN: CPT | Mod: CPTII,S$GLB,, | Performed by: INTERNAL MEDICINE

## 2022-06-07 PROCEDURE — 1159F MED LIST DOCD IN RCRD: CPT | Mod: CPTII,S$GLB,, | Performed by: INTERNAL MEDICINE

## 2022-06-07 PROCEDURE — 3008F BODY MASS INDEX DOCD: CPT | Mod: CPTII,S$GLB,, | Performed by: INTERNAL MEDICINE

## 2022-06-07 PROCEDURE — 3008F PR BODY MASS INDEX (BMI) DOCUMENTED: ICD-10-PCS | Mod: CPTII,S$GLB,, | Performed by: INTERNAL MEDICINE

## 2022-06-07 PROCEDURE — 1126F PR PAIN SEVERITY QUANTIFIED, NO PAIN PRESENT: ICD-10-PCS | Mod: CPTII,S$GLB,, | Performed by: INTERNAL MEDICINE

## 2022-06-07 PROCEDURE — 4010F PR ACE/ARB THEARPY RXD/TAKEN: ICD-10-PCS | Mod: CPTII,S$GLB,, | Performed by: INTERNAL MEDICINE

## 2022-06-07 PROCEDURE — 3288F PR FALLS RISK ASSESSMENT DOCUMENTED: ICD-10-PCS | Mod: CPTII,S$GLB,, | Performed by: INTERNAL MEDICINE

## 2022-06-07 PROCEDURE — 1126F AMNT PAIN NOTED NONE PRSNT: CPT | Mod: CPTII,S$GLB,, | Performed by: INTERNAL MEDICINE

## 2022-06-07 PROCEDURE — 99214 OFFICE O/P EST MOD 30 MIN: CPT | Mod: S$GLB,,, | Performed by: INTERNAL MEDICINE

## 2022-06-07 PROCEDURE — 1160F PR REVIEW ALL MEDS BY PRESCRIBER/CLIN PHARMACIST DOCUMENTED: ICD-10-PCS | Mod: CPTII,S$GLB,, | Performed by: INTERNAL MEDICINE

## 2022-06-07 PROCEDURE — 99999 PR PBB SHADOW E&M-EST. PATIENT-LVL III: CPT | Mod: PBBFAC,,, | Performed by: INTERNAL MEDICINE

## 2022-06-07 PROCEDURE — 1160F RVW MEDS BY RX/DR IN RCRD: CPT | Mod: CPTII,S$GLB,, | Performed by: INTERNAL MEDICINE

## 2022-06-07 PROCEDURE — 99999 PR PBB SHADOW E&M-EST. PATIENT-LVL III: ICD-10-PCS | Mod: PBBFAC,,, | Performed by: INTERNAL MEDICINE

## 2022-06-07 PROCEDURE — 1101F PT FALLS ASSESS-DOCD LE1/YR: CPT | Mod: CPTII,S$GLB,, | Performed by: INTERNAL MEDICINE

## 2022-06-07 PROCEDURE — 1101F PR PT FALLS ASSESS DOC 0-1 FALLS W/OUT INJ PAST YR: ICD-10-PCS | Mod: CPTII,S$GLB,, | Performed by: INTERNAL MEDICINE

## 2022-06-07 PROCEDURE — 3078F PR MOST RECENT DIASTOLIC BLOOD PRESSURE < 80 MM HG: ICD-10-PCS | Mod: CPTII,S$GLB,, | Performed by: INTERNAL MEDICINE

## 2022-06-07 RX ORDER — LOSARTAN POTASSIUM 100 MG/1
100 TABLET ORAL DAILY
Qty: 90 TABLET | Refills: 3 | Status: SHIPPED | OUTPATIENT
Start: 2022-06-07 | End: 2022-10-11 | Stop reason: SDUPTHER

## 2022-06-07 NOTE — PROGRESS NOTES
Subjective:     Hany Glass is a 71 y.o. male with hypertension. He has a heathy weight. He noted mild palpitations in about 12/2014. The palpitations did not really bother him. He saw his internist on 4/17/2015 and was noted to be in atrial fibrillation with well controlled ventricular response rate. He was referred for an echocardiogram on 5/26/201. The echocardiogram revealed normal left ventricular size with mildly depressed systolic function. The left atrium was of normal size. On 5/29/2015 he underwent a holter which revealed paroxysmal atrial fibrillation with intermittent atrial fibrillation with well controlled as well as fast ventricular response rate with activities. There was normal sinus rhythm in between. He reported no symptoms during the holter recording. I saw him on 9/18/2015 and he then had a [id number] score of 1 (A). In the setting of mild left ventricular dysfunction it was felt he should be anticoagulated and he was advised to begin rivaroxaban and stop aspirin. He was given the prescription but decided to stay on the aspirin as he was feeling well and did not have any palpitations. He was feeling well in 1/2016 when I saw him and he again opted to stay on aspirin. On 6/12/2017 he had a holter which revealed persistent atrial fibrillation 76 () bpm. In 10/2021 it appeared clear that he had hypertension. No exertional chest pain or exertional dyspnea. No palpitations or weak spells. No bleeding. No issues with any of his prescribed medications. Feeling well overall.      Atrial Fibrillation  Presents for follow-up visit. Symptoms are negative for bradycardia, chest pain, dizziness, hemodynamic instability, hypertension, hypotension, palpitations, shortness of breath, syncope, tachycardia and weakness. The symptoms have been stable. Past treatments include nothing. Past medical history includes atrial fibrillation.   Hypertension  This is a chronic problem. The problem is  uncontrolled (usually 130-150/60-70 mmHg at home). Pertinent negatives include no anxiety, blurred vision, chest pain, headaches, malaise/fatigue, neck pain, orthopnea, palpitations, PND, shortness of breath or sweats.       Review of Systems   Constitutional: Negative for chills, fever and malaise/fatigue.   HENT: Negative for nosebleeds.    Eyes: Negative for blurred vision, discharge, vision loss in left eye and vision loss in right eye.   Cardiovascular: Negative for chest pain, claudication, dyspnea on exertion, irregular heartbeat, leg swelling, near-syncope, orthopnea, palpitations, paroxysmal nocturnal dyspnea and syncope.   Respiratory: Negative for cough, hemoptysis, shortness of breath and wheezing.    Endocrine: Negative for cold intolerance and heat intolerance.   Hematologic/Lymphatic: Negative for bleeding problem. Does not bruise/bleed easily.   Skin: Negative for color change and rash.   Musculoskeletal: Negative for arthritis, falls, myalgias and neck pain.   Gastrointestinal: Negative for heartburn, hematemesis, hematochezia, hemorrhoids, jaundice, melena, nausea and vomiting.   Genitourinary: Negative for dysuria and hematuria.   Neurological: Negative for dizziness, focal weakness, headaches, light-headedness, loss of balance, vertigo and weakness.   Psychiatric/Behavioral: Negative for altered mental status, depression and substance abuse. The patient is not nervous/anxious.    Allergic/Immunologic: Negative for hives and persistent infections.       Current Outpatient Medications on File Prior to Visit   Medication Sig Dispense Refill    apixaban (ELIQUIS) 5 mg Tab Take 1 tablet (5 mg total) by mouth 2 (two) times daily. 180 tablet 3    losartan (COZAAR) 50 MG tablet Take 1 tablet (50 mg total) by mouth once daily. 90 tablet 3    vitamin E 400 UNIT capsule Take 400 Units by mouth once daily.       No current facility-administered medications on file prior to visit.       BP (!) 140/72 (BP  Location: Right arm, Patient Position: Sitting, BP Method: Medium (Manual))   Pulse 76   Ht 6' (1.829 m)   Wt 69.2 kg (152 lb 7.2 oz)   SpO2 98%   BMI 20.68 kg/m²       Objective:     Physical Exam  Constitutional:       General: He is not in acute distress.     Appearance: Normal appearance. He is well-developed. He is not ill-appearing or diaphoretic.   HENT:      Head: Normocephalic and atraumatic.      Nose: Nose normal.      Mouth/Throat:      Pharynx: No oropharyngeal exudate.   Eyes:      General:         Right eye: No discharge.         Left eye: No discharge.      Conjunctiva/sclera:      Right eye: No hemorrhage.     Left eye: No hemorrhage.     Pupils: Pupils are equal.      Right eye: Pupil is round.      Left eye: Pupil is round.   Neck:      Thyroid: No thyromegaly.      Vascular: No carotid bruit or JVD.   Cardiovascular:      Rate and Rhythm: Normal rate. Rhythm irregularly irregular.      Chest Wall: PMI is not displaced.      Pulses:           Radial pulses are 2+ on the right side and 2+ on the left side.        Dorsalis pedis pulses are 2+ on the right side and 2+ on the left side.        Posterior tibial pulses are 2+ on the right side and 2+ on the left side.      Heart sounds: S1 normal and S2 normal. No murmur heard.    No gallop.   Pulmonary:      Effort: Pulmonary effort is normal.      Breath sounds: Normal breath sounds.   Abdominal:      Palpations: Abdomen is soft.      Tenderness: There is no abdominal tenderness.   Musculoskeletal:      Cervical back: Neck supple.      Right ankle: No swelling, deformity or ecchymosis.      Left ankle: No swelling, deformity or ecchymosis.   Lymphadenopathy:      Head:      Right side of head: No submandibular adenopathy.      Left side of head: No submandibular adenopathy.      Cervical: No cervical adenopathy.   Skin:     General: Skin is warm and dry.      Findings: No rash.   Neurological:      General: No focal deficit present.      Mental  Status: He is alert and oriented to person, place, and time. He is not disoriented.      Cranial Nerves: No cranial nerve deficit.      Sensory: No sensory deficit.   Psychiatric:         Attention and Perception: Attention and perception normal.         Mood and Affect: Mood and affect normal.         Speech: Speech normal.         Behavior: Behavior normal.         Thought Content: Thought content normal.         Cognition and Memory: Cognition normal.         Judgment: Judgment normal.         Assessment:      1. Permanent atrial fibrillation    2. Chronic anticoagulation    3. Primary hypertension        Plan:     1. Atrial Fibrillation   4/17/2015: Diagnosed with essentially asymptomatic paroxysmal atrial fibrillation.    [id number]=0 (HA).    5/26/2015: Echo: Normal left ventricular size with mildly depressed systolic function. Normal LA size.   5/29/2015: Holter: Paroxysmal atrial fibrillation with intermittent atrial fibrillation with well as well as fast VRR. Sinus in between. No symptoms.   1/21/2016: ECG: NSR but on exam appeared to be in atrial fibrillation.   12/8/2016: ECG: Atrial fibrillation with VRR of 75 bpm.   6/12/2017: Holter: Atrial fibrillation 76 () bpm.   8/8/2018: Began apixiban.   6/3/2021:  Metoprolol 25 mg Q24 was discontinued as rate appeared on slightly slow side.   Discussed issues at length with patient: Stroke risk 2-3%/year.   VRR appears well controlled with no AV slowing agent.   Essentially asymptomatic.        2. Chronic Anticoagulation   4/17/2015: Diagnosed with essentially asymptomatic paroxysmal atrial fibrillation.    [id number]=7 (A).    6/2016: Apixiban was advised but not begun.   8/8/2018: Apixiban was begun.   On apixiban 5 mg Q12.   No aspirin or NSAID.   No bleeding.    3. Hypertension   12/12/2018: 149/85 mmHg.   4/24/2019: 137/66 mmHg.   10/1/2020: 152/83 mmHg.   10/2021: Diagnosed.   10/19/2021: Losartan 50 mg Q24 was begun.   On losartan 50 mg  Q24.   Keeping log at home.   6/7/2022: Running 130-150/70-80 mmHg at home. Losartan 50 mg Q24 to be increased to losartan 100 mg Q24.      4. Primary Care   Dr. Monet Leach.    F/u 4 months.    Rickie Gallardo M.D.

## 2022-10-11 ENCOUNTER — OFFICE VISIT (OUTPATIENT)
Dept: CARDIOLOGY | Facility: CLINIC | Age: 72
End: 2022-10-11
Attending: INTERNAL MEDICINE
Payer: MEDICARE

## 2022-10-11 VITALS
WEIGHT: 149 LBS | OXYGEN SATURATION: 99 % | SYSTOLIC BLOOD PRESSURE: 125 MMHG | DIASTOLIC BLOOD PRESSURE: 70 MMHG | HEART RATE: 63 BPM | HEIGHT: 72 IN | BODY MASS INDEX: 20.18 KG/M2

## 2022-10-11 DIAGNOSIS — I48.21 PERMANENT ATRIAL FIBRILLATION: ICD-10-CM

## 2022-10-11 DIAGNOSIS — Z79.01 CHRONIC ANTICOAGULATION: ICD-10-CM

## 2022-10-11 DIAGNOSIS — I10 PRIMARY HYPERTENSION: ICD-10-CM

## 2022-10-11 PROCEDURE — 99999 PR PBB SHADOW E&M-EST. PATIENT-LVL III: ICD-10-PCS | Mod: PBBFAC,,, | Performed by: INTERNAL MEDICINE

## 2022-10-11 PROCEDURE — 93000 ELECTROCARDIOGRAM COMPLETE: CPT | Mod: S$GLB,,, | Performed by: INTERNAL MEDICINE

## 2022-10-11 PROCEDURE — 1126F AMNT PAIN NOTED NONE PRSNT: CPT | Mod: CPTII,S$GLB,, | Performed by: INTERNAL MEDICINE

## 2022-10-11 PROCEDURE — 3008F PR BODY MASS INDEX (BMI) DOCUMENTED: ICD-10-PCS | Mod: CPTII,S$GLB,, | Performed by: INTERNAL MEDICINE

## 2022-10-11 PROCEDURE — 93000 PR ELECTROCARDIOGRAM, COMPLETE: ICD-10-PCS | Mod: S$GLB,,, | Performed by: INTERNAL MEDICINE

## 2022-10-11 PROCEDURE — 3074F SYST BP LT 130 MM HG: CPT | Mod: CPTII,S$GLB,, | Performed by: INTERNAL MEDICINE

## 2022-10-11 PROCEDURE — 3008F BODY MASS INDEX DOCD: CPT | Mod: CPTII,S$GLB,, | Performed by: INTERNAL MEDICINE

## 2022-10-11 PROCEDURE — 3078F PR MOST RECENT DIASTOLIC BLOOD PRESSURE < 80 MM HG: ICD-10-PCS | Mod: CPTII,S$GLB,, | Performed by: INTERNAL MEDICINE

## 2022-10-11 PROCEDURE — 1159F MED LIST DOCD IN RCRD: CPT | Mod: CPTII,S$GLB,, | Performed by: INTERNAL MEDICINE

## 2022-10-11 PROCEDURE — 1159F PR MEDICATION LIST DOCUMENTED IN MEDICAL RECORD: ICD-10-PCS | Mod: CPTII,S$GLB,, | Performed by: INTERNAL MEDICINE

## 2022-10-11 PROCEDURE — 3078F DIAST BP <80 MM HG: CPT | Mod: CPTII,S$GLB,, | Performed by: INTERNAL MEDICINE

## 2022-10-11 PROCEDURE — 99214 OFFICE O/P EST MOD 30 MIN: CPT | Mod: 25,S$GLB,, | Performed by: INTERNAL MEDICINE

## 2022-10-11 PROCEDURE — 99999 PR PBB SHADOW E&M-EST. PATIENT-LVL III: CPT | Mod: PBBFAC,,, | Performed by: INTERNAL MEDICINE

## 2022-10-11 PROCEDURE — 4010F ACE/ARB THERAPY RXD/TAKEN: CPT | Mod: CPTII,S$GLB,, | Performed by: INTERNAL MEDICINE

## 2022-10-11 PROCEDURE — 99214 PR OFFICE/OUTPT VISIT, EST, LEVL IV, 30-39 MIN: ICD-10-PCS | Mod: 25,S$GLB,, | Performed by: INTERNAL MEDICINE

## 2022-10-11 PROCEDURE — 1160F PR REVIEW ALL MEDS BY PRESCRIBER/CLIN PHARMACIST DOCUMENTED: ICD-10-PCS | Mod: CPTII,S$GLB,, | Performed by: INTERNAL MEDICINE

## 2022-10-11 PROCEDURE — 93005 ELECTROCARDIOGRAM TRACING: CPT

## 2022-10-11 PROCEDURE — 3074F PR MOST RECENT SYSTOLIC BLOOD PRESSURE < 130 MM HG: ICD-10-PCS | Mod: CPTII,S$GLB,, | Performed by: INTERNAL MEDICINE

## 2022-10-11 PROCEDURE — 1126F PR PAIN SEVERITY QUANTIFIED, NO PAIN PRESENT: ICD-10-PCS | Mod: CPTII,S$GLB,, | Performed by: INTERNAL MEDICINE

## 2022-10-11 PROCEDURE — 1160F RVW MEDS BY RX/DR IN RCRD: CPT | Mod: CPTII,S$GLB,, | Performed by: INTERNAL MEDICINE

## 2022-10-11 PROCEDURE — 4010F PR ACE/ARB THEARPY RXD/TAKEN: ICD-10-PCS | Mod: CPTII,S$GLB,, | Performed by: INTERNAL MEDICINE

## 2022-10-11 RX ORDER — LOSARTAN POTASSIUM 100 MG/1
100 TABLET ORAL DAILY
Qty: 90 TABLET | Refills: 3 | Status: SHIPPED | OUTPATIENT
Start: 2022-10-11 | End: 2023-06-02

## 2022-10-11 NOTE — PROGRESS NOTES
Subjective:     Hany Glass is a 72 y.o. male with hypertension. He has a heathy weight. He noted mild palpitations in about 12/2014. The palpitations did not really bother him. He saw his internist on 4/17/2015 and was noted to be in atrial fibrillation with well controlled ventricular response rate. He was referred for an echocardiogram on 5/26/201. The echocardiogram revealed normal left ventricular size with mildly depressed systolic function. The left atrium was of normal size. On 5/29/2015 he underwent a holter which revealed paroxysmal atrial fibrillation with intermittent atrial fibrillation with well controlled as well as fast ventricular response rate with activities. There was normal sinus rhythm in between. He reported no symptoms during the holter recording. I saw him on 9/18/2015 and he then had a [id number] score of 1 (A). In the setting of mild left ventricular dysfunction it was felt he should be anticoagulated and he was advised to begin rivaroxaban and stop aspirin. He was given the prescription but decided to stay on the aspirin as he was feeling well and did not have any palpitations. He was feeling well in 1/2016 when I saw him and he again opted to stay on aspirin. On 6/12/2017 he had a holter which revealed persistent atrial fibrillation 76 () bpm. In 10/2021 it appeared clear that he had hypertension. No exertional chest pain or exertional dyspnea. No palpitations or weak spells. No bleeding. No issues with any of his prescribed medications. Feeling well overall.      Atrial Fibrillation  Presents for follow-up visit. The condition has lasted for  months. Symptoms are negative for bradycardia, chest pain, dizziness, hemodynamic instability, hypertension, hypotension, palpitations, shortness of breath, syncope, tachycardia and weakness. The symptoms have been stable. Past treatments include nothing.   Hypertension  This is a chronic problem. The problem is uncontrolled  (usually 130-150/60-70 mmHg at home). Pertinent negatives include no anxiety, blurred vision, chest pain, headaches, malaise/fatigue, neck pain, orthopnea, palpitations, PND, shortness of breath or sweats.     Review of Systems   Constitutional: Negative for chills, fever and malaise/fatigue.   HENT:  Negative for nosebleeds.    Eyes:  Negative for blurred vision, discharge, vision loss in left eye and vision loss in right eye.   Cardiovascular:  Negative for chest pain, claudication, dyspnea on exertion, irregular heartbeat, leg swelling, near-syncope, orthopnea, palpitations, paroxysmal nocturnal dyspnea and syncope.   Respiratory:  Negative for cough, hemoptysis, shortness of breath and wheezing.    Endocrine: Negative for cold intolerance and heat intolerance.   Hematologic/Lymphatic: Negative for bleeding problem. Does not bruise/bleed easily.   Skin:  Negative for color change and rash.   Musculoskeletal:  Negative for arthritis, falls, myalgias and neck pain.   Gastrointestinal:  Negative for heartburn, hematemesis, hematochezia, hemorrhoids, jaundice, melena, nausea and vomiting.   Genitourinary:  Negative for dysuria and hematuria.   Neurological:  Negative for dizziness, focal weakness, headaches, light-headedness, loss of balance, vertigo and weakness.   Psychiatric/Behavioral:  Negative for altered mental status, depression and substance abuse. The patient is not nervous/anxious.    Allergic/Immunologic: Negative for hives and persistent infections.       Current Outpatient Medications on File Prior to Visit   Medication Sig Dispense Refill    apixaban (ELIQUIS) 5 mg Tab Take 1 tablet (5 mg total) by mouth 2 (two) times daily. 180 tablet 3    losartan (COZAAR) 100 MG tablet Take 1 tablet (100 mg total) by mouth once daily. 90 tablet 3    vitamin E 400 UNIT capsule Take 400 Units by mouth once daily.       No current facility-administered medications on file prior to visit.       /70 (BP Location:  Left arm, Patient Position: Sitting, BP Method: Large (Manual))   Pulse 63   Ht 6' (1.829 m)   Wt 67.6 kg (149 lb)   SpO2 99%   BMI 20.21 kg/m²       Objective:     Physical Exam  Constitutional:       General: He is not in acute distress.     Appearance: Normal appearance. He is well-developed. He is not ill-appearing or diaphoretic.   HENT:      Head: Normocephalic and atraumatic.      Nose: Nose normal.      Mouth/Throat:      Pharynx: No oropharyngeal exudate.   Eyes:      General:         Right eye: No discharge.         Left eye: No discharge.      Conjunctiva/sclera:      Right eye: No hemorrhage.     Left eye: No hemorrhage.     Pupils: Pupils are equal.      Right eye: Pupil is round.      Left eye: Pupil is round.   Neck:      Thyroid: No thyromegaly.      Vascular: No carotid bruit or JVD.   Cardiovascular:      Rate and Rhythm: Normal rate. Rhythm irregularly irregular.      Chest Wall: PMI is not displaced.      Pulses:           Radial pulses are 2+ on the right side and 2+ on the left side.        Dorsalis pedis pulses are 2+ on the right side and 2+ on the left side.        Posterior tibial pulses are 2+ on the right side and 2+ on the left side.      Heart sounds: S1 normal and S2 normal. No murmur heard.    No gallop.   Pulmonary:      Effort: Pulmonary effort is normal.      Breath sounds: Normal breath sounds.   Abdominal:      Palpations: Abdomen is soft.      Tenderness: There is no abdominal tenderness.   Musculoskeletal:      Cervical back: Neck supple.      Right ankle: No swelling, deformity or ecchymosis.      Left ankle: No swelling, deformity or ecchymosis.   Lymphadenopathy:      Head:      Right side of head: No submandibular adenopathy.      Left side of head: No submandibular adenopathy.      Cervical: No cervical adenopathy.   Skin:     General: Skin is warm and dry.      Findings: No rash.   Neurological:      General: No focal deficit present.      Mental Status: He is alert  and oriented to person, place, and time. He is not disoriented.      Cranial Nerves: No cranial nerve deficit.      Sensory: No sensory deficit.   Psychiatric:         Attention and Perception: Attention and perception normal.         Mood and Affect: Mood and affect normal.         Speech: Speech normal.         Behavior: Behavior normal.         Thought Content: Thought content normal.         Cognition and Memory: Cognition normal.         Judgment: Judgment normal.       Assessment:      1. Permanent atrial fibrillation    2. Chronic anticoagulation    3. Primary hypertension        Plan:     1. Atrial Fibrillation   4/17/2015: Diagnosed with essentially asymptomatic paroxysmal atrial fibrillation.    [id number]=1 (HA).    5/26/2015: Echo: Normal left ventricular size with mildly depressed systolic function. Normal LA size.   5/29/2015: Holter: Paroxysmal atrial fibrillation with intermittent atrial fibrillation with well as well as fast VRR. Sinus in between. No symptoms.   1/21/2016: ECG: NSR but on exam appeared to be in atrial fibrillation.   12/8/2016: ECG: Atrial fibrillation with VRR of 75 bpm.   6/12/2017: Holter: Atrial fibrillation 76 () bpm.   8/8/2018: Began apixiban.   6/3/2021:  Metoprolol 25 mg Q24 was discontinued as rate appeared on slightly slow side.   Discussed issues at length with patient: Stroke risk 2-3%/year.   VRR appears well controlled with no AV slowing agent.   Essentially asymptomatic.        2. Chronic Anticoagulation   4/17/2015: Diagnosed with essentially asymptomatic paroxysmal atrial fibrillation.    [id number]=0 (HA).    6/2016: Apixiban was advised but not begun.   8/8/2018: Apixiban was begun.   On apixiban 5 mg Q12.   No aspirin or NSAID.   No bleeding.    3. Hypertension   12/12/2018: 149/85 mmHg.   4/24/2019: 137/66 mmHg.   10/1/2020: 152/83 mmHg.   10/2021: Diagnosed.   10/19/2021: Losartan 50 mg Q24 was begun.   6/7/2022: Losartan 50 mg Q24 was increased to  losartan 100 mg Q24 as pressure was running 130-150/70-80 mmHg at home.    On losartan 100 mg Q24.   Keeping log at home.   Well controlled.      4. Primary Care   Dr. Monet Leach.    F/u 4 months.    Ricike Gallardo M.D.

## 2023-03-01 ENCOUNTER — OFFICE VISIT (OUTPATIENT)
Dept: CARDIOLOGY | Facility: CLINIC | Age: 73
End: 2023-03-01
Attending: INTERNAL MEDICINE
Payer: MEDICARE

## 2023-03-01 VITALS
HEART RATE: 89 BPM | OXYGEN SATURATION: 98 % | SYSTOLIC BLOOD PRESSURE: 140 MMHG | HEIGHT: 72 IN | BODY MASS INDEX: 20.31 KG/M2 | DIASTOLIC BLOOD PRESSURE: 84 MMHG | WEIGHT: 149.94 LBS

## 2023-03-01 DIAGNOSIS — I48.21 PERMANENT ATRIAL FIBRILLATION: ICD-10-CM

## 2023-03-01 DIAGNOSIS — Z79.01 CHRONIC ANTICOAGULATION: ICD-10-CM

## 2023-03-01 DIAGNOSIS — I10 PRIMARY HYPERTENSION: ICD-10-CM

## 2023-03-01 PROCEDURE — 1126F PR PAIN SEVERITY QUANTIFIED, NO PAIN PRESENT: ICD-10-PCS | Mod: CPTII,S$GLB,, | Performed by: INTERNAL MEDICINE

## 2023-03-01 PROCEDURE — 3079F DIAST BP 80-89 MM HG: CPT | Mod: CPTII,S$GLB,, | Performed by: INTERNAL MEDICINE

## 2023-03-01 PROCEDURE — 1160F PR REVIEW ALL MEDS BY PRESCRIBER/CLIN PHARMACIST DOCUMENTED: ICD-10-PCS | Mod: CPTII,S$GLB,, | Performed by: INTERNAL MEDICINE

## 2023-03-01 PROCEDURE — 1101F PT FALLS ASSESS-DOCD LE1/YR: CPT | Mod: CPTII,S$GLB,, | Performed by: INTERNAL MEDICINE

## 2023-03-01 PROCEDURE — 1101F PR PT FALLS ASSESS DOC 0-1 FALLS W/OUT INJ PAST YR: ICD-10-PCS | Mod: CPTII,S$GLB,, | Performed by: INTERNAL MEDICINE

## 2023-03-01 PROCEDURE — 99214 PR OFFICE/OUTPT VISIT, EST, LEVL IV, 30-39 MIN: ICD-10-PCS | Mod: S$GLB,,, | Performed by: INTERNAL MEDICINE

## 2023-03-01 PROCEDURE — 3079F PR MOST RECENT DIASTOLIC BLOOD PRESSURE 80-89 MM HG: ICD-10-PCS | Mod: CPTII,S$GLB,, | Performed by: INTERNAL MEDICINE

## 2023-03-01 PROCEDURE — 99999 PR PBB SHADOW E&M-EST. PATIENT-LVL III: CPT | Mod: PBBFAC,,, | Performed by: INTERNAL MEDICINE

## 2023-03-01 PROCEDURE — 1160F RVW MEDS BY RX/DR IN RCRD: CPT | Mod: CPTII,S$GLB,, | Performed by: INTERNAL MEDICINE

## 2023-03-01 PROCEDURE — 3008F PR BODY MASS INDEX (BMI) DOCUMENTED: ICD-10-PCS | Mod: CPTII,S$GLB,, | Performed by: INTERNAL MEDICINE

## 2023-03-01 PROCEDURE — 3288F FALL RISK ASSESSMENT DOCD: CPT | Mod: CPTII,S$GLB,, | Performed by: INTERNAL MEDICINE

## 2023-03-01 PROCEDURE — 99214 OFFICE O/P EST MOD 30 MIN: CPT | Mod: S$GLB,,, | Performed by: INTERNAL MEDICINE

## 2023-03-01 PROCEDURE — 3077F PR MOST RECENT SYSTOLIC BLOOD PRESSURE >= 140 MM HG: ICD-10-PCS | Mod: CPTII,S$GLB,, | Performed by: INTERNAL MEDICINE

## 2023-03-01 PROCEDURE — 1159F PR MEDICATION LIST DOCUMENTED IN MEDICAL RECORD: ICD-10-PCS | Mod: CPTII,S$GLB,, | Performed by: INTERNAL MEDICINE

## 2023-03-01 PROCEDURE — 99999 PR PBB SHADOW E&M-EST. PATIENT-LVL III: ICD-10-PCS | Mod: PBBFAC,,, | Performed by: INTERNAL MEDICINE

## 2023-03-01 PROCEDURE — 3077F SYST BP >= 140 MM HG: CPT | Mod: CPTII,S$GLB,, | Performed by: INTERNAL MEDICINE

## 2023-03-01 PROCEDURE — 3008F BODY MASS INDEX DOCD: CPT | Mod: CPTII,S$GLB,, | Performed by: INTERNAL MEDICINE

## 2023-03-01 PROCEDURE — 1126F AMNT PAIN NOTED NONE PRSNT: CPT | Mod: CPTII,S$GLB,, | Performed by: INTERNAL MEDICINE

## 2023-03-01 PROCEDURE — 1159F MED LIST DOCD IN RCRD: CPT | Mod: CPTII,S$GLB,, | Performed by: INTERNAL MEDICINE

## 2023-03-01 PROCEDURE — 3288F PR FALLS RISK ASSESSMENT DOCUMENTED: ICD-10-PCS | Mod: CPTII,S$GLB,, | Performed by: INTERNAL MEDICINE

## 2023-03-01 RX ORDER — SILDENAFIL 100 MG/1
100 TABLET, FILM COATED ORAL DAILY PRN
COMMUNITY

## 2023-03-01 RX ORDER — AMLODIPINE BESYLATE 2.5 MG/1
2.5 TABLET ORAL DAILY
Qty: 90 TABLET | Refills: 3 | Status: SHIPPED | OUTPATIENT
Start: 2023-03-01 | End: 2023-07-13 | Stop reason: SDUPTHER

## 2023-03-01 NOTE — PROGRESS NOTES
Subjective:     Hany Glass is a 72 y.o. male with hypertension. He has a heathy weight. He noted mild palpitations in about 12/2014. The palpitations did not really bother him. He saw his internist on 4/17/2015 and was noted to be in atrial fibrillation with well controlled ventricular response rate. He was referred for an echocardiogram on 5/26/201. The echocardiogram revealed normal left ventricular size with mildly depressed systolic function. The left atrium was of normal size. On 5/29/2015 he underwent a holter which revealed paroxysmal atrial fibrillation with intermittent atrial fibrillation with well controlled as well as fast ventricular response rate with activities. There was normal sinus rhythm in between. He reported no symptoms during the holter recording. I saw him on 9/18/2015 and he then had a [id number] score of 1 (A). In the setting of mild left ventricular dysfunction it was felt he should be anticoagulated and he was advised to begin rivaroxaban and stop aspirin. He was given the prescription but decided to stay on the aspirin as he was feeling well and did not have any palpitations. He was feeling well in 1/2016 when I saw him and he again opted to stay on aspirin. He began apixiban in 6/2016. On 6/12/2017 he had a holter which revealed persistent atrial fibrillation 76 () bpm. In 10/2021 it appeared clear that he had hypertension. No exertional chest pain or exertional dyspnea. No palpitations or weak spells. No bleeding. No issues with any of his prescribed medications. Feeling well overall.      Atrial Fibrillation  Presents for follow-up visit. The condition has lasted for  months. Symptoms are negative for bradycardia, chest pain, dizziness, hemodynamic instability, hypertension, hypotension, palpitations, shortness of breath, syncope, tachycardia and weakness. The symptoms have been stable. Past treatments include nothing.   Hypertension  This is a chronic problem. The  problem is uncontrolled (usually 130-150/60-70 mmHg at home). Pertinent negatives include no anxiety, blurred vision, chest pain, headaches, malaise/fatigue, neck pain, orthopnea, palpitations, PND, shortness of breath or sweats.     Review of Systems   Constitutional: Negative for chills, fever and malaise/fatigue.   HENT:  Negative for nosebleeds.    Eyes:  Negative for blurred vision, discharge, vision loss in left eye and vision loss in right eye.   Cardiovascular:  Negative for chest pain, claudication, dyspnea on exertion, irregular heartbeat, leg swelling, near-syncope, orthopnea, palpitations, paroxysmal nocturnal dyspnea and syncope.   Respiratory:  Negative for cough, hemoptysis, shortness of breath and wheezing.    Endocrine: Negative for cold intolerance and heat intolerance.   Hematologic/Lymphatic: Negative for bleeding problem. Does not bruise/bleed easily.   Skin:  Negative for color change and rash.   Musculoskeletal:  Negative for arthritis, falls, myalgias and neck pain.   Gastrointestinal:  Negative for heartburn, hematemesis, hematochezia, hemorrhoids, jaundice, melena, nausea and vomiting.   Genitourinary:  Negative for dysuria and hematuria.   Neurological:  Negative for dizziness, focal weakness, headaches, light-headedness, loss of balance, vertigo and weakness.   Psychiatric/Behavioral:  Negative for altered mental status, depression and substance abuse. The patient is not nervous/anxious.    Allergic/Immunologic: Negative for hives and persistent infections.       Current Outpatient Medications on File Prior to Visit   Medication Sig Dispense Refill    apixaban (ELIQUIS) 5 mg Tab Take 1 tablet (5 mg total) by mouth 2 (two) times daily. 180 tablet 3    losartan (COZAAR) 100 MG tablet Take 1 tablet (100 mg total) by mouth once daily. 90 tablet 3    sildenafiL (VIAGRA) 100 MG tablet Take 100 mg by mouth daily as needed for Erectile Dysfunction.      vitamin E 400 UNIT capsule Take 400 Units  by mouth once daily.      losartan (COZAAR) 50 MG tablet TAKE 1 TABLET(50 MG) BY MOUTH EVERY DAY 90 tablet 3     No current facility-administered medications on file prior to visit.       BP (!) 140/84 (BP Location: Right arm, Patient Position: Sitting, BP Method: Medium (Manual))   Pulse 89   Ht 6' (1.829 m)   Wt 68 kg (149 lb 14.6 oz)   SpO2 98%   BMI 20.33 kg/m²       Objective:     Physical Exam  Constitutional:       General: He is not in acute distress.     Appearance: Normal appearance. He is well-developed. He is not ill-appearing or diaphoretic.   HENT:      Head: Normocephalic and atraumatic.      Nose: Nose normal.      Mouth/Throat:      Pharynx: No oropharyngeal exudate.   Eyes:      General:         Right eye: No discharge.         Left eye: No discharge.      Conjunctiva/sclera:      Right eye: No hemorrhage.     Left eye: No hemorrhage.     Pupils: Pupils are equal.      Right eye: Pupil is round.      Left eye: Pupil is round.   Neck:      Thyroid: No thyromegaly.      Vascular: No carotid bruit or JVD.   Cardiovascular:      Rate and Rhythm: Normal rate. Rhythm irregularly irregular.      Chest Wall: PMI is not displaced.      Pulses:           Radial pulses are 2+ on the right side and 2+ on the left side.        Dorsalis pedis pulses are 2+ on the right side and 2+ on the left side.        Posterior tibial pulses are 2+ on the right side and 2+ on the left side.      Heart sounds: S1 normal and S2 normal. No murmur heard.    No gallop.   Pulmonary:      Effort: Pulmonary effort is normal.      Breath sounds: Normal breath sounds.   Abdominal:      Palpations: Abdomen is soft.      Tenderness: There is no abdominal tenderness.   Musculoskeletal:      Cervical back: Neck supple.      Right ankle: No swelling, deformity or ecchymosis.      Left ankle: No swelling, deformity or ecchymosis.   Lymphadenopathy:      Head:      Right side of head: No submandibular adenopathy.      Left side of head:  No submandibular adenopathy.      Cervical: No cervical adenopathy.   Skin:     General: Skin is warm and dry.      Findings: No rash.   Neurological:      General: No focal deficit present.      Mental Status: He is alert and oriented to person, place, and time. He is not disoriented.      Cranial Nerves: No cranial nerve deficit.      Sensory: No sensory deficit.   Psychiatric:         Attention and Perception: Attention and perception normal.         Mood and Affect: Mood and affect normal.         Speech: Speech normal.         Behavior: Behavior normal.         Thought Content: Thought content normal.         Cognition and Memory: Cognition normal.         Judgment: Judgment normal.       Assessment:      1. Permanent atrial fibrillation    2. Chronic anticoagulation    3. Primary hypertension        Plan:     1. Atrial Fibrillation   4/17/2015: Diagnosed with essentially asymptomatic paroxysmal atrial fibrillation.    [id number]=2 (HA).    5/26/2015: Echo: Normal left ventricular size with mildly depressed systolic function. Normal LA size.   5/29/2015: Holter: Paroxysmal atrial fibrillation with intermittent atrial fibrillation with well as well as fast VRR. Sinus in between. No symptoms.   1/21/2016: ECG: NSR but on exam appeared to be in atrial fibrillation.   12/8/2016: ECG: Atrial fibrillation with VRR of 75 bpm.   6/12/2017: Holter: Atrial fibrillation 76 () bpm.   8/8/2018: Began apixiban.   6/3/2021:  Metoprolol 25 mg Q24 was discontinued as rate appeared on slightly slow side.   VRR appears well controlled with no AV slowing agent.   Essentially asymptomatic.        2. Chronic Anticoagulation   4/17/2015: Diagnosed with essentially asymptomatic paroxysmal atrial fibrillation.    [id number]=5 (HA).    6/2016: Apixiban was advised but not begun.   8/8/2018: Apixiban was begun.   On apixiban 5 mg Q12.   No aspirin or NSAID.   No bleeding.    3. Hypertension   12/12/2018: 149/85 mmHg.   4/24/2019:  137/66 mmHg.   10/1/2020: 152/83 mmHg.   10/2021: Diagnosed.   10/19/2021: Losartan 50 mg Q24 was begun.   6/7/2022: Losartan 50 mg Q24 was increased to losartan 100 mg Q24 as pressure was running 130-150/70-80 mmHg at home.    On losartan 100 mg Q24.   Keeping log at home.   3/1/2023: Slightly high. Amlodipine 2.5 mg Q24 to begin.      4. Primary Care   Dr. Monet Leach.    F/u 4 months.    Rickie Gallardo M.D.

## 2023-03-13 NOTE — PROGRESS NOTES
Subjective:     Hany Glass is a 68 y.o. male with essentially unremarkable past medical history. He noted to have mild palpitations in about 12/2014. Did not really bother him. He saw his internist on 4/17/2015 and noted to be in atrial fibrillation with well controlled ventricular response rate. He was referred for an Echocardiogram on 5/26/2015 that revealed normal left ventricular size with mildly depressed systolic function. The left atrium was of normal size. On 5/29/2015 he underwent a Holter which revealed paroxysmal atrial fibrillation with intermittent atrial fibrillation with well controlled as well as fast ventricular response rate with activities. There was normal sinus rhythm in between. There were no symptoms during the Holter recording. I saw him on 9/18/2015 and with a CHADS2 score of 0 but with a [id number] of 1 (A) especially in the setting of mild left ventricular dysfunction it was felt he should be anticoagulated and he was advised to start rivaroxaban and stop aspirin. He was given the prescription but decided to stay on the aspirin as he was feeling well and did not have any palpitations. He was feeling well in 1/2016 when I saw him and he again opted to stay on aspirin. On 6/12/2017 he had a Holter which revealed persistent atrial fibrillation 76 () bpm. There has been no weak spells. No exertional chest pain or exertional dyspnea. No bleeding. Feeling well overall.      Atrial Fibrillation   Presents for follow-up visit. Symptoms are negative for bradycardia, chest pain, dizziness, hemodynamic instability, hypertension, hypotension, palpitations, shortness of breath, syncope, tachycardia and weakness. The symptoms have been stable. Past treatments include nothing. Past medical history includes atrial fibrillation.       Review of Systems   Constitution: Negative for chills, fever and malaise/fatigue.   HENT: Negative for nosebleeds.    Eyes: Negative for discharge, vision loss  in left eye and vision loss in right eye.   Cardiovascular: Negative for chest pain, claudication, dyspnea on exertion, irregular heartbeat, leg swelling, near-syncope, orthopnea, palpitations, paroxysmal nocturnal dyspnea and syncope.   Respiratory: Negative for cough, hemoptysis, shortness of breath and wheezing.    Endocrine: Negative for cold intolerance and heat intolerance.   Hematologic/Lymphatic: Negative for bleeding problem. Does not bruise/bleed easily.   Skin: Negative for color change and rash.   Musculoskeletal: Negative for arthritis, falls and myalgias.   Gastrointestinal: Negative for heartburn, hematemesis, hematochezia, hemorrhoids, jaundice, melena, nausea and vomiting.   Genitourinary: Negative for dysuria and hematuria.   Neurological: Negative for dizziness, focal weakness, headaches, light-headedness, loss of balance, vertigo and weakness.   Psychiatric/Behavioral: Negative for altered mental status, depression and substance abuse. The patient is not nervous/anxious.    Allergic/Immunologic: Negative for hives and persistent infections.       Current Outpatient Medications on File Prior to Visit   Medication Sig Dispense Refill    apixaban (ELIQUIS) 5 mg Tab Take 1 tablet (5 mg total) by mouth 2 (two) times daily. 180 tablet 3    metoprolol succinate (TOPROL-XL) 25 MG 24 hr tablet Take 1 tablet (25 mg total) by mouth once daily. 90 tablet 3     No current facility-administered medications on file prior to visit.        /62   Pulse 65   Ht 6' (1.829 m)   Wt 67.6 kg (149 lb)   BMI 20.21 kg/m²       Objective:     Physical Exam   Constitutional: He is oriented to person, place, and time. He appears well-developed and well-nourished. He does not appear ill. No distress.   HENT:   Head: Normocephalic and atraumatic.   Nose: Nose normal.   Mouth/Throat: No oropharyngeal exudate.   Eyes: Right eye exhibits no discharge. Left eye exhibits no discharge. Right conjunctiva has no hemorrhage.  Left conjunctiva has no hemorrhage. Right pupil is round. Left pupil is round. Pupils are equal.   Neck: Neck supple. No JVD present. Carotid bruit is not present. No thyromegaly present.   Cardiovascular: Normal rate, S1 normal and S2 normal. An irregularly irregular rhythm present.  No extrasystoles are present. PMI is not displaced. Exam reveals no gallop.   No murmur heard.  Pulses:       Radial pulses are 2+ on the right side, and 2+ on the left side.        Dorsalis pedis pulses are 2+ on the right side, and 2+ on the left side.        Posterior tibial pulses are 2+ on the right side, and 2+ on the left side.   Pulmonary/Chest: Effort normal and breath sounds normal.   Abdominal: Soft. Normal appearance. There is no hepatosplenomegaly. There is no tenderness.   Musculoskeletal:        Right ankle: He exhibits no swelling, no ecchymosis and no deformity.        Left ankle: He exhibits no swelling, no ecchymosis and no deformity.   Lymphadenopathy:        Head (right side): No submandibular adenopathy present.        Head (left side): No submandibular adenopathy present.     He has no cervical adenopathy.   Neurological: He is alert and oriented to person, place, and time. He is not disoriented. No cranial nerve deficit or sensory deficit.   Skin: Skin is warm, dry and intact. No rash noted. He is not diaphoretic.   Psychiatric: He has a normal mood and affect. His speech is normal and behavior is normal. Judgment and thought content normal. Cognition and memory are normal.       Assessment:      1. Chronic atrial fibrillation    2. Chronic anticoagulation        Plan:     1. Atrial Fibrillation   4/17/2015: Diagnosed with essentially asymptomatic paroxysmal atrial fibrillation.    [id number]=7 (A).    5/26/2015: Echo: Normal left ventricular size with mildly depressed systolic function. Normal LA size.   5/29/2015: Holter: Paroxysmal atrial fibrillation with intermittent atrial fibrillation with well as well  as fast VRR. Sinus in between. No symptoms.   1/21/2016: ECG: NSR but on exam appeared to be in atrial fibrillation.   12/8/2016: ECG: Atrial fibrillation with VRR of 75 bpm.   6/12/2017: Holter: Atrial fibrillation 76 () bpm.   Discussed issues at length with patient: Stroke risk 2-3 %/year.   Could possibly consider an ablation. Discussed in detail.   8/8/2018: Began apixiban.   On metoprolol 25 mg Q24.   Rate appears well controlled.      2. Chronic Anticoagulation   4/17/2015: Diagnosed with essentially asymptomatic paroxysmal atrial fibrillation.    [id number]=9 (A).    6/2016: Advised apixiban.   8/8/2018: Began apixiban.   On apixiban 5 mg Q12.   No aspirin or NSAID.   No bleeding.    3. Blood Pressure   12/12/2018: 149/85 mmHg.   4/24/2019: 137/66 mmHg.   Keeping log at home.   Well controlled at home.      4. Primary Care   Dr. Monet Leach    F/u 4 months.    Rickie Gallardo M.D.             Bactrim Pregnancy And Lactation Text: This medication is Pregnancy Category D and is known to cause fetal risk.  It is also excreted in breast milk.

## 2023-06-02 DIAGNOSIS — I10 PRIMARY HYPERTENSION: ICD-10-CM

## 2023-06-02 RX ORDER — LOSARTAN POTASSIUM 100 MG/1
TABLET ORAL
Qty: 90 TABLET | Refills: 3 | Status: SHIPPED | OUTPATIENT
Start: 2023-06-02 | End: 2023-07-13 | Stop reason: SDUPTHER

## 2023-07-13 ENCOUNTER — OFFICE VISIT (OUTPATIENT)
Dept: CARDIOLOGY | Facility: CLINIC | Age: 73
End: 2023-07-13
Attending: INTERNAL MEDICINE
Payer: MEDICARE

## 2023-07-13 VITALS
BODY MASS INDEX: 19.78 KG/M2 | DIASTOLIC BLOOD PRESSURE: 57 MMHG | WEIGHT: 146.06 LBS | HEART RATE: 75 BPM | SYSTOLIC BLOOD PRESSURE: 119 MMHG | HEIGHT: 72 IN | OXYGEN SATURATION: 98 %

## 2023-07-13 DIAGNOSIS — I10 PRIMARY HYPERTENSION: ICD-10-CM

## 2023-07-13 DIAGNOSIS — I48.21 PERMANENT ATRIAL FIBRILLATION: ICD-10-CM

## 2023-07-13 DIAGNOSIS — Z79.01 CHRONIC ANTICOAGULATION: ICD-10-CM

## 2023-07-13 PROCEDURE — 3074F SYST BP LT 130 MM HG: CPT | Mod: CPTII,S$GLB,, | Performed by: INTERNAL MEDICINE

## 2023-07-13 PROCEDURE — 3288F PR FALLS RISK ASSESSMENT DOCUMENTED: ICD-10-PCS | Mod: CPTII,S$GLB,, | Performed by: INTERNAL MEDICINE

## 2023-07-13 PROCEDURE — 1101F PR PT FALLS ASSESS DOC 0-1 FALLS W/OUT INJ PAST YR: ICD-10-PCS | Mod: CPTII,S$GLB,, | Performed by: INTERNAL MEDICINE

## 2023-07-13 PROCEDURE — 3288F FALL RISK ASSESSMENT DOCD: CPT | Mod: CPTII,S$GLB,, | Performed by: INTERNAL MEDICINE

## 2023-07-13 PROCEDURE — 99214 OFFICE O/P EST MOD 30 MIN: CPT | Mod: S$GLB,,, | Performed by: INTERNAL MEDICINE

## 2023-07-13 PROCEDURE — 99999 PR PBB SHADOW E&M-EST. PATIENT-LVL III: CPT | Mod: PBBFAC,,, | Performed by: INTERNAL MEDICINE

## 2023-07-13 PROCEDURE — 3078F PR MOST RECENT DIASTOLIC BLOOD PRESSURE < 80 MM HG: ICD-10-PCS | Mod: CPTII,S$GLB,, | Performed by: INTERNAL MEDICINE

## 2023-07-13 PROCEDURE — 3078F DIAST BP <80 MM HG: CPT | Mod: CPTII,S$GLB,, | Performed by: INTERNAL MEDICINE

## 2023-07-13 PROCEDURE — 1126F PR PAIN SEVERITY QUANTIFIED, NO PAIN PRESENT: ICD-10-PCS | Mod: CPTII,S$GLB,, | Performed by: INTERNAL MEDICINE

## 2023-07-13 PROCEDURE — 99214 PR OFFICE/OUTPT VISIT, EST, LEVL IV, 30-39 MIN: ICD-10-PCS | Mod: S$GLB,,, | Performed by: INTERNAL MEDICINE

## 2023-07-13 PROCEDURE — 3008F PR BODY MASS INDEX (BMI) DOCUMENTED: ICD-10-PCS | Mod: CPTII,S$GLB,, | Performed by: INTERNAL MEDICINE

## 2023-07-13 PROCEDURE — 3074F PR MOST RECENT SYSTOLIC BLOOD PRESSURE < 130 MM HG: ICD-10-PCS | Mod: CPTII,S$GLB,, | Performed by: INTERNAL MEDICINE

## 2023-07-13 PROCEDURE — 1126F AMNT PAIN NOTED NONE PRSNT: CPT | Mod: CPTII,S$GLB,, | Performed by: INTERNAL MEDICINE

## 2023-07-13 PROCEDURE — 1159F PR MEDICATION LIST DOCUMENTED IN MEDICAL RECORD: ICD-10-PCS | Mod: CPTII,S$GLB,, | Performed by: INTERNAL MEDICINE

## 2023-07-13 PROCEDURE — 4010F PR ACE/ARB THEARPY RXD/TAKEN: ICD-10-PCS | Mod: CPTII,S$GLB,, | Performed by: INTERNAL MEDICINE

## 2023-07-13 PROCEDURE — 3008F BODY MASS INDEX DOCD: CPT | Mod: CPTII,S$GLB,, | Performed by: INTERNAL MEDICINE

## 2023-07-13 PROCEDURE — 99999 PR PBB SHADOW E&M-EST. PATIENT-LVL III: ICD-10-PCS | Mod: PBBFAC,,, | Performed by: INTERNAL MEDICINE

## 2023-07-13 PROCEDURE — 4010F ACE/ARB THERAPY RXD/TAKEN: CPT | Mod: CPTII,S$GLB,, | Performed by: INTERNAL MEDICINE

## 2023-07-13 PROCEDURE — 1101F PT FALLS ASSESS-DOCD LE1/YR: CPT | Mod: CPTII,S$GLB,, | Performed by: INTERNAL MEDICINE

## 2023-07-13 PROCEDURE — 1159F MED LIST DOCD IN RCRD: CPT | Mod: CPTII,S$GLB,, | Performed by: INTERNAL MEDICINE

## 2023-07-13 RX ORDER — AMLODIPINE BESYLATE 2.5 MG/1
2.5 TABLET ORAL DAILY
Qty: 90 TABLET | Refills: 3 | Status: SHIPPED | OUTPATIENT
Start: 2023-07-13

## 2023-07-13 RX ORDER — LOSARTAN POTASSIUM 100 MG/1
100 TABLET ORAL DAILY
Qty: 90 TABLET | Refills: 3 | Status: SHIPPED | OUTPATIENT
Start: 2023-07-13

## 2023-07-13 NOTE — PROGRESS NOTES
Subjective:     Hany Glass is a 72 y.o. male with hypertension. He has a heathy weight. He noted mild palpitations in about 12/2014. The palpitations did not really bother him. He saw his internist on 4/17/2015 and was noted to be in atrial fibrillation with well controlled ventricular response rate. He was referred for an echocardiogram on 5/26/201. The echocardiogram revealed normal left ventricular size with mildly depressed systolic function. The left atrium was of normal size. On 5/29/2015 he underwent a holter which revealed paroxysmal atrial fibrillation with intermittent atrial fibrillation with well controlled as well as fast ventricular response rate with activities. There was normal sinus rhythm in between. He reported no symptoms during the holter recording. I saw him on 9/18/2015 and he then had a [id number] score of 1 (A). In the setting of mild left ventricular dysfunction it was felt he should be anticoagulated and he was advised to begin rivaroxaban and stop aspirin. He was given the prescription but decided to stay on the aspirin as he was feeling well and did not have any palpitations. He was feeling well in 1/2016 when I saw him and he again opted to stay on aspirin. He began apixiban in 6/2016. On 6/12/2017 he had a holter which revealed persistent atrial fibrillation 76 () bpm. In 10/2021 it appeared clear that he had hypertension. No exertional chest pain or exertional dyspnea. No palpitations or weak spells. No bleeding. No issues with any of his prescribed medications. Feeling well overall.      Atrial Fibrillation  Presents for follow-up visit. The condition has lasted for  months. Symptoms are negative for bradycardia, chest pain, dizziness, hemodynamic instability, hypertension, hypotension, palpitations, shortness of breath, syncope, tachycardia and weakness. The symptoms have been stable. Past treatments include nothing.   Hypertension  This is a chronic problem. The  problem is uncontrolled (usually 130-150/60-70 mmHg at home). Pertinent negatives include no anxiety, blurred vision, chest pain, headaches, malaise/fatigue, neck pain, orthopnea, palpitations, PND, shortness of breath or sweats.     Review of Systems   Constitutional: Negative for chills, fever and malaise/fatigue.   HENT:  Negative for nosebleeds.    Eyes:  Negative for blurred vision, discharge, vision loss in left eye and vision loss in right eye.   Cardiovascular:  Negative for chest pain, claudication, dyspnea on exertion, irregular heartbeat, leg swelling, near-syncope, orthopnea, palpitations, paroxysmal nocturnal dyspnea and syncope.   Respiratory:  Negative for cough, hemoptysis, shortness of breath and wheezing.    Endocrine: Negative for cold intolerance and heat intolerance.   Hematologic/Lymphatic: Negative for bleeding problem. Does not bruise/bleed easily.   Skin:  Negative for color change and rash.   Musculoskeletal:  Negative for arthritis, falls, myalgias and neck pain.   Gastrointestinal:  Negative for heartburn, hematemesis, hematochezia, hemorrhoids, jaundice, melena, nausea and vomiting.   Genitourinary:  Negative for dysuria and hematuria.   Neurological:  Negative for dizziness, focal weakness, headaches, light-headedness, loss of balance, vertigo and weakness.   Psychiatric/Behavioral:  Negative for altered mental status, depression and substance abuse. The patient is not nervous/anxious.    Allergic/Immunologic: Negative for hives and persistent infections.       Current Outpatient Medications on File Prior to Visit   Medication Sig Dispense Refill    amLODIPine (NORVASC) 2.5 MG tablet Take 1 tablet (2.5 mg total) by mouth once daily. 90 tablet 3    apixaban (ELIQUIS) 5 mg Tab Take 1 tablet (5 mg total) by mouth 2 (two) times daily. 180 tablet 3    losartan (COZAAR) 100 MG tablet TAKE 1 TABLET(100 MG) BY MOUTH EVERY DAY 90 tablet 3    sildenafiL (VIAGRA) 100 MG tablet Take 100 mg by mouth  daily as needed for Erectile Dysfunction.      vitamin E 400 UNIT capsule Take 400 Units by mouth once daily.       No current facility-administered medications on file prior to visit.       BP (!) 119/57 (BP Location: Left arm, Patient Position: Sitting, BP Method: Medium (Manual))   Pulse 75   Ht 6' (1.829 m)   Wt 66.3 kg (146 lb 0.9 oz)   SpO2 98%   BMI 19.81 kg/m²       Objective:     Physical Exam  Constitutional:       General: He is not in acute distress.     Appearance: Normal appearance. He is well-developed. He is not ill-appearing or diaphoretic.   HENT:      Head: Normocephalic and atraumatic.      Nose: Nose normal.      Mouth/Throat:      Pharynx: No oropharyngeal exudate.   Eyes:      General:         Right eye: No discharge.         Left eye: No discharge.      Conjunctiva/sclera:      Right eye: No hemorrhage.     Left eye: No hemorrhage.     Pupils: Pupils are equal.      Right eye: Pupil is round.      Left eye: Pupil is round.   Neck:      Thyroid: No thyromegaly.      Vascular: No carotid bruit or JVD.   Cardiovascular:      Rate and Rhythm: Normal rate. Rhythm irregularly irregular.      Chest Wall: PMI is not displaced.      Pulses:           Radial pulses are 2+ on the right side and 2+ on the left side.        Dorsalis pedis pulses are 2+ on the right side and 2+ on the left side.        Posterior tibial pulses are 2+ on the right side and 2+ on the left side.      Heart sounds: S1 normal and S2 normal. No murmur heard.    No gallop.   Pulmonary:      Effort: Pulmonary effort is normal.      Breath sounds: Normal breath sounds.   Abdominal:      Palpations: Abdomen is soft.      Tenderness: There is no abdominal tenderness.   Musculoskeletal:      Cervical back: Neck supple.      Right ankle: No swelling, deformity or ecchymosis.      Left ankle: No swelling, deformity or ecchymosis.   Lymphadenopathy:      Head:      Right side of head: No submandibular adenopathy.      Left side of  head: No submandibular adenopathy.      Cervical: No cervical adenopathy.   Skin:     General: Skin is warm and dry.      Findings: No rash.   Neurological:      General: No focal deficit present.      Mental Status: He is alert and oriented to person, place, and time. He is not disoriented.      Cranial Nerves: No cranial nerve deficit.      Sensory: No sensory deficit.   Psychiatric:         Attention and Perception: Attention and perception normal.         Mood and Affect: Mood and affect normal.         Speech: Speech normal.         Behavior: Behavior normal.         Thought Content: Thought content normal.         Cognition and Memory: Cognition normal.         Judgment: Judgment normal.       Assessment:      1. Permanent atrial fibrillation    2. Chronic anticoagulation    3. Primary hypertension        Plan:     1. Atrial Fibrillation   4/17/2015: Diagnosed with essentially asymptomatic paroxysmal atrial fibrillation.    [id number]=7 (HA).    5/26/2015: Echo: Normal left ventricular size with mildly depressed systolic function. Normal LA size.   5/29/2015: Holter: Paroxysmal atrial fibrillation with intermittent atrial fibrillation with well as well as fast VRR. Sinus in between. No symptoms.   1/21/2016: ECG: NSR but on exam appeared to be in atrial fibrillation.   12/8/2016: ECG: Atrial fibrillation with VRR of 75 bpm.   6/12/2017: Holter: Atrial fibrillation 76 () bpm.   8/8/2018: Began apixiban.   6/3/2021:  Metoprolol 25 mg Q24 was discontinued as rate appeared on slightly slow side.   VRR appears well controlled with no AV slowing agent.   Essentially asymptomatic.        2. Chronic Anticoagulation   4/17/2015: Diagnosed with essentially asymptomatic paroxysmal atrial fibrillation.    [id number]=5 (HA).    6/2016: Apixiban was advised but not begun.   8/8/2018: Apixiban was begun.   On apixiban 5 mg Q12.   No aspirin or NSAID.   No bleeding.    3. Hypertension   12/12/2018: 149/85  mmHg.   4/24/2019: 137/66 mmHg.   10/1/2020: 152/83 mmHg.   10/2021: Diagnosed.   10/19/2021: Losartan 50 mg Q24 was begun.   6/7/2022: Losartan 50 mg Q24 was increased to losartan 100 mg Q24 as pressure was running 130-150/70-80 mmHg at home.    3/1/2023: Amlodipine 2.5 mg Q24 was begun as pressure slightly high.    On amlodipine 2.5 mg Q24 and losartan 100 mg Q24.   Keeping log at home.   Well controlled.      4. Primary Care   Dr. Monet Leach.    F/u 4 months.    Rickie Gallardo M.D.

## 2023-11-28 ENCOUNTER — OFFICE VISIT (OUTPATIENT)
Dept: CARDIOLOGY | Facility: CLINIC | Age: 73
End: 2023-11-28
Attending: INTERNAL MEDICINE
Payer: MEDICARE

## 2023-11-28 VITALS
HEART RATE: 82 BPM | SYSTOLIC BLOOD PRESSURE: 127 MMHG | DIASTOLIC BLOOD PRESSURE: 62 MMHG | WEIGHT: 144.94 LBS | BODY MASS INDEX: 19.63 KG/M2 | HEIGHT: 72 IN | OXYGEN SATURATION: 99 %

## 2023-11-28 DIAGNOSIS — I10 PRIMARY HYPERTENSION: ICD-10-CM

## 2023-11-28 DIAGNOSIS — Z79.01 CHRONIC ANTICOAGULATION: ICD-10-CM

## 2023-11-28 DIAGNOSIS — I48.21 PERMANENT ATRIAL FIBRILLATION: ICD-10-CM

## 2023-11-28 PROCEDURE — 99999 PR PBB SHADOW E&M-EST. PATIENT-LVL III: ICD-10-PCS | Mod: PBBFAC,,, | Performed by: INTERNAL MEDICINE

## 2023-11-28 PROCEDURE — 1160F RVW MEDS BY RX/DR IN RCRD: CPT | Mod: CPTII,S$GLB,, | Performed by: INTERNAL MEDICINE

## 2023-11-28 PROCEDURE — 1160F PR REVIEW ALL MEDS BY PRESCRIBER/CLIN PHARMACIST DOCUMENTED: ICD-10-PCS | Mod: CPTII,S$GLB,, | Performed by: INTERNAL MEDICINE

## 2023-11-28 PROCEDURE — 4010F ACE/ARB THERAPY RXD/TAKEN: CPT | Mod: CPTII,S$GLB,, | Performed by: INTERNAL MEDICINE

## 2023-11-28 PROCEDURE — 3074F PR MOST RECENT SYSTOLIC BLOOD PRESSURE < 130 MM HG: ICD-10-PCS | Mod: CPTII,S$GLB,, | Performed by: INTERNAL MEDICINE

## 2023-11-28 PROCEDURE — 3078F PR MOST RECENT DIASTOLIC BLOOD PRESSURE < 80 MM HG: ICD-10-PCS | Mod: CPTII,S$GLB,, | Performed by: INTERNAL MEDICINE

## 2023-11-28 PROCEDURE — 99214 PR OFFICE/OUTPT VISIT, EST, LEVL IV, 30-39 MIN: ICD-10-PCS | Mod: 25,S$GLB,, | Performed by: INTERNAL MEDICINE

## 2023-11-28 PROCEDURE — 4010F PR ACE/ARB THEARPY RXD/TAKEN: ICD-10-PCS | Mod: CPTII,S$GLB,, | Performed by: INTERNAL MEDICINE

## 2023-11-28 PROCEDURE — 1101F PR PT FALLS ASSESS DOC 0-1 FALLS W/OUT INJ PAST YR: ICD-10-PCS | Mod: CPTII,S$GLB,, | Performed by: INTERNAL MEDICINE

## 2023-11-28 PROCEDURE — 1159F MED LIST DOCD IN RCRD: CPT | Mod: CPTII,S$GLB,, | Performed by: INTERNAL MEDICINE

## 2023-11-28 PROCEDURE — 93010 EKG 12-LEAD: ICD-10-PCS | Mod: S$GLB,,, | Performed by: INTERNAL MEDICINE

## 2023-11-28 PROCEDURE — 3288F PR FALLS RISK ASSESSMENT DOCUMENTED: ICD-10-PCS | Mod: CPTII,S$GLB,, | Performed by: INTERNAL MEDICINE

## 2023-11-28 PROCEDURE — 1126F AMNT PAIN NOTED NONE PRSNT: CPT | Mod: CPTII,S$GLB,, | Performed by: INTERNAL MEDICINE

## 2023-11-28 PROCEDURE — 99214 OFFICE O/P EST MOD 30 MIN: CPT | Mod: 25,S$GLB,, | Performed by: INTERNAL MEDICINE

## 2023-11-28 PROCEDURE — 93010 ELECTROCARDIOGRAM REPORT: CPT | Mod: S$GLB,,, | Performed by: INTERNAL MEDICINE

## 2023-11-28 PROCEDURE — 3288F FALL RISK ASSESSMENT DOCD: CPT | Mod: CPTII,S$GLB,, | Performed by: INTERNAL MEDICINE

## 2023-11-28 PROCEDURE — 1101F PT FALLS ASSESS-DOCD LE1/YR: CPT | Mod: CPTII,S$GLB,, | Performed by: INTERNAL MEDICINE

## 2023-11-28 PROCEDURE — 3074F SYST BP LT 130 MM HG: CPT | Mod: CPTII,S$GLB,, | Performed by: INTERNAL MEDICINE

## 2023-11-28 PROCEDURE — 1159F PR MEDICATION LIST DOCUMENTED IN MEDICAL RECORD: ICD-10-PCS | Mod: CPTII,S$GLB,, | Performed by: INTERNAL MEDICINE

## 2023-11-28 PROCEDURE — 1126F PR PAIN SEVERITY QUANTIFIED, NO PAIN PRESENT: ICD-10-PCS | Mod: CPTII,S$GLB,, | Performed by: INTERNAL MEDICINE

## 2023-11-28 PROCEDURE — 99999 PR PBB SHADOW E&M-EST. PATIENT-LVL III: CPT | Mod: PBBFAC,,, | Performed by: INTERNAL MEDICINE

## 2023-11-28 PROCEDURE — 3078F DIAST BP <80 MM HG: CPT | Mod: CPTII,S$GLB,, | Performed by: INTERNAL MEDICINE

## 2023-11-28 PROCEDURE — 93005 ELECTROCARDIOGRAM TRACING: CPT

## 2023-11-28 NOTE — PROGRESS NOTES
Subjective:     Hany Glass is a 73 y.o. male with hypertension. He has a heathy weight. He noted mild palpitations in about 12/2014. The palpitations did not really bother him. He saw his internist on 4/17/2015 and was noted to be in atrial fibrillation with well controlled ventricular response rate. He was referred for an echocardiogram on 5/26/201. The echocardiogram revealed normal left ventricular size with mildly depressed systolic function. The left atrium was of normal size. On 5/29/2015 he underwent a holter which revealed paroxysmal atrial fibrillation with intermittent atrial fibrillation with well controlled as well as fast ventricular response rate with activities. There was normal sinus rhythm in between. He reported no symptoms during the holter recording. I saw him on 9/18/2015 and he then had a [id number] score of 1 (A). In the setting of mild left ventricular dysfunction it was felt he should be anticoagulated and he was advised to begin rivaroxaban and stop aspirin. He was given the prescription but decided to stay on the aspirin as he was feeling well and did not have any palpitations. He was feeling well in 1/2016 when I saw him and he again opted to stay on aspirin. He began apixiban in 6/2016. On 6/12/2017 he had a holter which revealed persistent atrial fibrillation 76 () bpm. In 10/2021 it appeared clear that he had hypertension. No exertional chest pain or exertional dyspnea. No palpitations or weak spells. No bleeding. No issues with any of his prescribed medications. Feeling well overall.      Atrial Fibrillation  Presents for follow-up visit. The condition has lasted for  months. Symptoms are negative for bradycardia, chest pain, dizziness, hemodynamic instability, hypertension, hypotension, palpitations, shortness of breath, syncope, tachycardia and weakness. The symptoms have been stable. Past treatments include nothing.   Hypertension  This is a chronic problem. The  problem is controlled (usually 120-130/60-70 mmHg at home). Pertinent negatives include no anxiety, blurred vision, chest pain, headaches, malaise/fatigue, neck pain, orthopnea, palpitations, peripheral edema, PND, shortness of breath or sweats.       Review of Systems   Constitutional: Negative for chills, fever and malaise/fatigue.   HENT:  Negative for nosebleeds.    Eyes:  Negative for blurred vision, discharge, vision loss in left eye and vision loss in right eye.   Cardiovascular:  Negative for chest pain, claudication, dyspnea on exertion, irregular heartbeat, leg swelling, near-syncope, orthopnea, palpitations, paroxysmal nocturnal dyspnea and syncope.   Respiratory:  Negative for cough, hemoptysis, shortness of breath and wheezing.    Endocrine: Negative for cold intolerance and heat intolerance.   Hematologic/Lymphatic: Negative for bleeding problem. Does not bruise/bleed easily.   Skin:  Negative for color change and rash.   Musculoskeletal:  Negative for arthritis, falls, myalgias and neck pain.   Gastrointestinal:  Negative for heartburn, hematemesis, hematochezia, hemorrhoids, jaundice, melena, nausea and vomiting.   Genitourinary:  Negative for dysuria and hematuria.   Neurological:  Negative for dizziness, focal weakness, headaches, light-headedness, loss of balance, vertigo and weakness.   Psychiatric/Behavioral:  Negative for altered mental status, depression and substance abuse. The patient is not nervous/anxious.    Allergic/Immunologic: Negative for hives and persistent infections.         Current Outpatient Medications on File Prior to Visit   Medication Sig Dispense Refill    amLODIPine (NORVASC) 2.5 MG tablet Take 1 tablet (2.5 mg total) by mouth once daily. 90 tablet 3    apixaban (ELIQUIS) 5 mg Tab Take 1 tablet (5 mg total) by mouth 2 (two) times daily. 180 tablet 3    losartan (COZAAR) 100 MG tablet Take 1 tablet (100 mg total) by mouth once daily. 90 tablet 3    sildenafiL (VIAGRA) 100  MG tablet Take 100 mg by mouth daily as needed for Erectile Dysfunction.      vitamin E 400 UNIT capsule Take 400 Units by mouth once daily.       No current facility-administered medications on file prior to visit.       /62   Pulse 82   Ht 6' (1.829 m)   Wt 65.8 kg (144 lb 15.2 oz)   SpO2 99%   BMI 19.66 kg/m²     Objective:     Physical Exam  Constitutional:       General: He is not in acute distress.     Appearance: Normal appearance. He is well-developed. He is not ill-appearing or diaphoretic.   HENT:      Head: Normocephalic and atraumatic.      Nose: Nose normal.      Mouth/Throat:      Pharynx: No oropharyngeal exudate.   Eyes:      General:         Right eye: No discharge.         Left eye: No discharge.      Conjunctiva/sclera:      Right eye: No hemorrhage.     Left eye: No hemorrhage.     Pupils: Pupils are equal.      Right eye: Pupil is round.      Left eye: Pupil is round.   Neck:      Thyroid: No thyromegaly.      Vascular: No carotid bruit or JVD.   Cardiovascular:      Rate and Rhythm: Normal rate. Rhythm irregularly irregular.      Chest Wall: PMI is not displaced.      Pulses:           Radial pulses are 2+ on the right side and 2+ on the left side.        Dorsalis pedis pulses are 2+ on the right side and 2+ on the left side.        Posterior tibial pulses are 2+ on the right side and 2+ on the left side.      Heart sounds: S1 normal and S2 normal. No murmur heard.     No gallop.   Pulmonary:      Effort: Pulmonary effort is normal.      Breath sounds: Normal breath sounds.   Abdominal:      Palpations: Abdomen is soft.      Tenderness: There is no abdominal tenderness.   Musculoskeletal:      Cervical back: Neck supple.      Right ankle: No swelling, deformity or ecchymosis.      Left ankle: No swelling, deformity or ecchymosis.   Lymphadenopathy:      Head:      Right side of head: No submandibular adenopathy.      Left side of head: No submandibular adenopathy.      Cervical: No  cervical adenopathy.   Skin:     General: Skin is warm and dry.      Findings: No rash.   Neurological:      General: No focal deficit present.      Mental Status: He is alert and oriented to person, place, and time. He is not disoriented.      Cranial Nerves: No cranial nerve deficit.      Sensory: No sensory deficit.   Psychiatric:         Attention and Perception: Attention and perception normal.         Mood and Affect: Mood and affect normal.         Speech: Speech normal.         Behavior: Behavior normal.         Thought Content: Thought content normal.         Cognition and Memory: Cognition normal.         Judgment: Judgment normal.         Assessment:      1. Permanent atrial fibrillation    2. Chronic anticoagulation    3. Primary hypertension        Plan:     1. Atrial Fibrillation   4/17/2015: Diagnosed with essentially asymptomatic paroxysmal atrial fibrillation.    [id number]=0 (HA).    5/26/2015: Echo: Normal left ventricular size with mildly depressed systolic function. Normal LA size.   5/29/2015: Holter: Paroxysmal atrial fibrillation with intermittent atrial fibrillation with well as well as fast VRR. Sinus in between. No symptoms.   1/21/2016: ECG: NSR but on exam appeared to be in atrial fibrillation.   12/8/2016: ECG: Atrial fibrillation with VRR of 75 bpm.   6/12/2017: Holter: Atrial fibrillation 76 () bpm.   8/8/2018: Began apixiban.   6/3/2021:  Metoprolol 25 mg Q24 was discontinued as rate appeared on slightly slow side.   VRR appears well controlled with no AV slowing agent.   Essentially asymptomatic.        2. Chronic Anticoagulation   4/17/2015: Diagnosed with essentially asymptomatic paroxysmal atrial fibrillation.    [id number]=4 (HA).    6/2016: Apixiban was advised but not begun.   8/8/2018: Apixiban was begun.   On apixiban 5 mg Q12.   No aspirin or NSAID.   No bleeding.    3. Hypertension   12/12/2018: 149/85 mmHg.   4/24/2019: 137/66 mmHg.   10/1/2020: 152/83  mmHg.   10/2021: Diagnosed.   10/19/2021: Losartan 50 mg Q24 was begun.   6/7/2022: Losartan 50 mg Q24 was increased to losartan 100 mg Q24 as pressure was running 130-150/70-80 mmHg at home.    3/1/2023: Amlodipine 2.5 mg Q24 was begun as pressure slightly high.    On amlodipine 2.5 mg Q24 and losartan 100 mg Q24.   Keeping log at home.   Well controlled.      4. Primary Care   Dr. Monet Leach.    F/u 4 months.    Rickie Gallardo M.D.

## 2024-04-09 ENCOUNTER — OFFICE VISIT (OUTPATIENT)
Dept: CARDIOLOGY | Facility: CLINIC | Age: 74
End: 2024-04-09
Attending: INTERNAL MEDICINE
Payer: MEDICARE

## 2024-04-09 VITALS
OXYGEN SATURATION: 95 % | HEIGHT: 73 IN | HEART RATE: 81 BPM | BODY MASS INDEX: 19.48 KG/M2 | SYSTOLIC BLOOD PRESSURE: 128 MMHG | WEIGHT: 147 LBS | DIASTOLIC BLOOD PRESSURE: 72 MMHG

## 2024-04-09 DIAGNOSIS — I10 PRIMARY HYPERTENSION: ICD-10-CM

## 2024-04-09 DIAGNOSIS — I48.21 PERMANENT ATRIAL FIBRILLATION: ICD-10-CM

## 2024-04-09 DIAGNOSIS — Z79.01 CHRONIC ANTICOAGULATION: ICD-10-CM

## 2024-04-09 PROCEDURE — 1160F RVW MEDS BY RX/DR IN RCRD: CPT | Mod: CPTII,S$GLB,, | Performed by: INTERNAL MEDICINE

## 2024-04-09 PROCEDURE — 99214 OFFICE O/P EST MOD 30 MIN: CPT | Mod: S$GLB,,, | Performed by: INTERNAL MEDICINE

## 2024-04-09 PROCEDURE — 3288F FALL RISK ASSESSMENT DOCD: CPT | Mod: CPTII,S$GLB,, | Performed by: INTERNAL MEDICINE

## 2024-04-09 PROCEDURE — 3074F SYST BP LT 130 MM HG: CPT | Mod: CPTII,S$GLB,, | Performed by: INTERNAL MEDICINE

## 2024-04-09 PROCEDURE — 1101F PT FALLS ASSESS-DOCD LE1/YR: CPT | Mod: CPTII,S$GLB,, | Performed by: INTERNAL MEDICINE

## 2024-04-09 PROCEDURE — 3078F DIAST BP <80 MM HG: CPT | Mod: CPTII,S$GLB,, | Performed by: INTERNAL MEDICINE

## 2024-04-09 PROCEDURE — 1126F AMNT PAIN NOTED NONE PRSNT: CPT | Mod: CPTII,S$GLB,, | Performed by: INTERNAL MEDICINE

## 2024-04-09 PROCEDURE — 99999 PR PBB SHADOW E&M-EST. PATIENT-LVL III: CPT | Mod: PBBFAC,,, | Performed by: INTERNAL MEDICINE

## 2024-04-09 PROCEDURE — 1159F MED LIST DOCD IN RCRD: CPT | Mod: CPTII,S$GLB,, | Performed by: INTERNAL MEDICINE

## 2024-04-09 RX ORDER — LOSARTAN POTASSIUM 100 MG/1
100 TABLET ORAL DAILY
Qty: 90 TABLET | Refills: 3 | Status: SHIPPED | OUTPATIENT
Start: 2024-04-09

## 2024-04-09 RX ORDER — AMLODIPINE BESYLATE 2.5 MG/1
2.5 TABLET ORAL DAILY
Qty: 90 TABLET | Refills: 3 | Status: SHIPPED | OUTPATIENT
Start: 2024-04-09

## 2024-04-09 NOTE — PROGRESS NOTES
Subjective:     Hany Glass is a 73 y.o. male with hypertension. He has a heathy weight. He noted mild palpitations in about 12/2014. The palpitations did not really bother him. He saw his internist on 4/17/2015 and was noted to be in atrial fibrillation with well controlled ventricular response rate. He was referred for an echocardiogram on 5/26/201. The echocardiogram revealed normal left ventricular size with mildly depressed systolic function. The left atrium was of normal size. On 5/29/2015 he underwent a holter which revealed paroxysmal atrial fibrillation with intermittent atrial fibrillation with well controlled as well as fast ventricular response rate with activities. There was normal sinus rhythm in between. He reported no symptoms during the holter recording. I saw him on 9/18/2015 and he then had a [id number] score of 1 (A). In the setting of mild left ventricular dysfunction it was felt he should be anticoagulated and he was advised to begin rivaroxaban and stop aspirin. He was given the prescription but decided to stay on the aspirin as he was feeling well and did not have any palpitations. He was feeling well in 1/2016 when I saw him and he again opted to stay on aspirin. He began apixiban in 6/2016. On 6/12/2017 he had a holter which revealed persistent atrial fibrillation 76 () bpm. In 10/2021 it appeared clear that he had hypertension. No exertional chest pain or exertional dyspnea. No palpitations or weak spells. No bleeding. No issues with any of his prescribed medications. Feeling well overall.      Atrial Fibrillation  Presents for follow-up visit. The condition has lasted for  months. Symptoms are negative for bradycardia, chest pain, dizziness, hemodynamic instability, hypertension, hypotension, palpitations, shortness of breath, syncope, tachycardia and weakness. The symptoms have been stable. Past treatments include nothing.   Hypertension  This is a chronic problem. The  problem is controlled (usually 120-130/60-70 mmHg at home). Pertinent negatives include no anxiety, blurred vision, chest pain, headaches, malaise/fatigue, neck pain, orthopnea, palpitations, peripheral edema, PND, shortness of breath or sweats.       Review of Systems   Constitutional: Negative for chills, fever and malaise/fatigue.   HENT:  Negative for nosebleeds.    Eyes:  Negative for blurred vision, discharge, vision loss in left eye and vision loss in right eye.   Cardiovascular:  Negative for chest pain, claudication, dyspnea on exertion, irregular heartbeat, leg swelling, near-syncope, orthopnea, palpitations, paroxysmal nocturnal dyspnea and syncope.   Respiratory:  Negative for cough, hemoptysis, shortness of breath and wheezing.    Endocrine: Negative for cold intolerance and heat intolerance.   Hematologic/Lymphatic: Negative for bleeding problem. Does not bruise/bleed easily.   Skin:  Negative for color change and rash.   Musculoskeletal:  Negative for arthritis, falls, myalgias and neck pain.   Gastrointestinal:  Negative for heartburn, hematemesis, hematochezia, hemorrhoids, jaundice, melena, nausea and vomiting.   Genitourinary:  Negative for dysuria and hematuria.   Neurological:  Negative for dizziness, focal weakness, headaches, light-headedness, loss of balance, vertigo and weakness.   Psychiatric/Behavioral:  Negative for altered mental status, depression and substance abuse. The patient is not nervous/anxious.    Allergic/Immunologic: Negative for hives and persistent infections.         Current Outpatient Medications on File Prior to Visit   Medication Sig Dispense Refill    amLODIPine (NORVASC) 2.5 MG tablet Take 1 tablet (2.5 mg total) by mouth once daily. 90 tablet 3    apixaban (ELIQUIS) 5 mg Tab Take 1 tablet (5 mg total) by mouth 2 (two) times daily. 180 tablet 3    losartan (COZAAR) 100 MG tablet Take 1 tablet (100 mg total) by mouth once daily. 90 tablet 3    sildenafiL (VIAGRA) 100  "MG tablet Take 100 mg by mouth daily as needed for Erectile Dysfunction.      vitamin E 400 UNIT capsule Take 400 Units by mouth once daily.       No current facility-administered medications on file prior to visit.       /72 (BP Location: Left arm, Patient Position: Sitting, BP Method: Medium (Automatic))   Pulse 81   Ht 6' 1.2" (1.859 m)   Wt 66.7 kg (147 lb)   SpO2 95%   BMI 19.29 kg/m²     Objective:     Physical Exam  Constitutional:       General: He is not in acute distress.     Appearance: Normal appearance. He is well-developed. He is not ill-appearing or diaphoretic.   HENT:      Head: Normocephalic and atraumatic.      Nose: Nose normal.      Mouth/Throat:      Pharynx: No oropharyngeal exudate.   Eyes:      General:         Right eye: No discharge.         Left eye: No discharge.      Conjunctiva/sclera:      Right eye: No hemorrhage.     Left eye: No hemorrhage.     Pupils: Pupils are equal.      Right eye: Pupil is round.      Left eye: Pupil is round.   Neck:      Thyroid: No thyromegaly.      Vascular: No carotid bruit or JVD.   Cardiovascular:      Rate and Rhythm: Normal rate. Rhythm irregularly irregular.      Chest Wall: PMI is not displaced.      Pulses:           Radial pulses are 2+ on the right side and 2+ on the left side.        Dorsalis pedis pulses are 2+ on the right side and 2+ on the left side.        Posterior tibial pulses are 2+ on the right side and 2+ on the left side.      Heart sounds: S1 normal and S2 normal. No murmur heard.     No gallop.   Pulmonary:      Effort: Pulmonary effort is normal.      Breath sounds: Normal breath sounds.   Abdominal:      Palpations: Abdomen is soft.      Tenderness: There is no abdominal tenderness.   Musculoskeletal:      Cervical back: Neck supple.      Right ankle: No swelling, deformity or ecchymosis.      Left ankle: No swelling, deformity or ecchymosis.   Lymphadenopathy:      Head:      Right side of head: No submandibular " adenopathy.      Left side of head: No submandibular adenopathy.      Cervical: No cervical adenopathy.   Skin:     General: Skin is warm and dry.      Findings: No rash.   Neurological:      General: No focal deficit present.      Mental Status: He is alert and oriented to person, place, and time. He is not disoriented.      Cranial Nerves: No cranial nerve deficit.      Sensory: No sensory deficit.   Psychiatric:         Attention and Perception: Attention and perception normal.         Mood and Affect: Mood and affect normal.         Speech: Speech normal.         Behavior: Behavior normal.         Thought Content: Thought content normal.         Cognition and Memory: Cognition normal.         Judgment: Judgment normal.         Assessment:      1. Permanent atrial fibrillation    2. Chronic anticoagulation    3. Primary hypertension        Plan:     1. Atrial Fibrillation   4/17/2015: Diagnosed with essentially asymptomatic paroxysmal atrial fibrillation.    [id number]=3 (HA).    5/26/2015: Echo: Normal left ventricular size with mildly depressed systolic function. Normal LA size.   5/29/2015: Holter: Paroxysmal atrial fibrillation with intermittent atrial fibrillation with well as well as fast VRR. Sinus in between. No symptoms.   1/21/2016: ECG: NSR but on exam appeared to be in atrial fibrillation.   12/8/2016: ECG: Atrial fibrillation with VRR of 75 bpm.   6/12/2017: Holter: Atrial fibrillation 76 () bpm.   8/8/2018: Began apixiban.   6/3/2021: Metoprolol 25 mg Q24 was discontinued as rate appeared on slightly slow side.   VRR appears well controlled with no AV slowing agent.   Essentially asymptomatic.        2. Chronic Anticoagulation   4/17/2015: Diagnosed with essentially asymptomatic paroxysmal atrial fibrillation.    [id number]=4 (HA).    6/2016: Apixiban was advised but not begun.   8/8/2018: Apixiban was begun.   On apixiban 5 mg Q12.   No aspirin or NSAID.   No bleeding.    3.  Hypertension   12/12/2018: 149/85 mmHg.   4/24/2019: 137/66 mmHg.   10/1/2020: 152/83 mmHg.   10/2021: Diagnosed.   10/19/2021: Losartan 50 mg Q24 was begun.   6/7/2022: Losartan 50 mg Q24 was increased to losartan 100 mg Q24 as pressure was running 130-150/70-80 mmHg at home.    3/1/2023: Amlodipine 2.5 mg Q24 was begun as pressure slightly high.    On amlodipine 2.5 mg Q24 and losartan 100 mg Q24.   Keeping log at home.   Well controlled.      4. Primary Care   Dr. Monet Leach.    F/u 4 months.    Rickie Gallardo M.D.

## 2024-08-13 ENCOUNTER — OFFICE VISIT (OUTPATIENT)
Dept: CARDIOLOGY | Facility: CLINIC | Age: 74
End: 2024-08-13
Attending: INTERNAL MEDICINE
Payer: MEDICARE

## 2024-08-13 VITALS
HEART RATE: 82 BPM | SYSTOLIC BLOOD PRESSURE: 127 MMHG | DIASTOLIC BLOOD PRESSURE: 60 MMHG | WEIGHT: 143.75 LBS | OXYGEN SATURATION: 97 % | BODY MASS INDEX: 19.05 KG/M2 | HEIGHT: 73 IN

## 2024-08-13 DIAGNOSIS — Z79.01 CHRONIC ANTICOAGULATION: ICD-10-CM

## 2024-08-13 DIAGNOSIS — I48.21 PERMANENT ATRIAL FIBRILLATION: ICD-10-CM

## 2024-08-13 DIAGNOSIS — I10 PRIMARY HYPERTENSION: ICD-10-CM

## 2024-08-13 PROCEDURE — 99214 OFFICE O/P EST MOD 30 MIN: CPT | Mod: S$GLB,,, | Performed by: INTERNAL MEDICINE

## 2024-08-13 PROCEDURE — 1101F PT FALLS ASSESS-DOCD LE1/YR: CPT | Mod: CPTII,S$GLB,, | Performed by: INTERNAL MEDICINE

## 2024-08-13 PROCEDURE — 1126F AMNT PAIN NOTED NONE PRSNT: CPT | Mod: CPTII,S$GLB,, | Performed by: INTERNAL MEDICINE

## 2024-08-13 PROCEDURE — 3078F DIAST BP <80 MM HG: CPT | Mod: CPTII,S$GLB,, | Performed by: INTERNAL MEDICINE

## 2024-08-13 PROCEDURE — 99999 PR PBB SHADOW E&M-EST. PATIENT-LVL III: CPT | Mod: PBBFAC,,, | Performed by: INTERNAL MEDICINE

## 2024-08-13 PROCEDURE — 3288F FALL RISK ASSESSMENT DOCD: CPT | Mod: CPTII,S$GLB,, | Performed by: INTERNAL MEDICINE

## 2024-08-13 PROCEDURE — 3074F SYST BP LT 130 MM HG: CPT | Mod: CPTII,S$GLB,, | Performed by: INTERNAL MEDICINE

## 2024-08-13 PROCEDURE — 1160F RVW MEDS BY RX/DR IN RCRD: CPT | Mod: CPTII,S$GLB,, | Performed by: INTERNAL MEDICINE

## 2024-08-13 PROCEDURE — 1159F MED LIST DOCD IN RCRD: CPT | Mod: CPTII,S$GLB,, | Performed by: INTERNAL MEDICINE

## 2024-08-13 NOTE — PROGRESS NOTES
Subjective:     Hany Glass is a 73 y.o. male with hypertension. He has a heathy weight. He noted mild palpitations in about 12/2014. The palpitations did not really bother him. He saw his internist on 4/17/2015 and was noted to be in atrial fibrillation with well controlled ventricular response rate. He was referred for an echocardiogram on 5/26/201. The echocardiogram revealed normal left ventricular size with mildly depressed systolic function. The left atrium was of normal size. On 5/29/2015 he underwent a holter which revealed paroxysmal atrial fibrillation with intermittent atrial fibrillation with well controlled as well as fast ventricular response rate with activities. There was normal sinus rhythm in between. He reported no symptoms during the holter recording. I saw him on 9/18/2015 and he then had a [id number] score of 1 (A). In the setting of mild left ventricular dysfunction it was felt he should be anticoagulated and he was advised to begin rivaroxaban and stop aspirin. He was given the prescription but decided to stay on the aspirin as he was feeling well and did not have any palpitations. He was feeling well in 1/2016 when I saw him and he again opted to stay on aspirin. He began apixiban in 6/2016. On 6/12/2017 he had a holter which revealed persistent atrial fibrillation 76 () bpm. In 10/2021 it appeared clear that he had hypertension. With that he has a [id number] score of 3 (HA2). No exertional chest pain or exertional dyspnea. No palpitations or weak spells. No bleeding. No issues with any of his prescribed medications. Feeling well overall.      Atrial Fibrillation  Presents for follow-up visit. The condition has lasted for  months. Symptoms are negative for bradycardia, chest pain, dizziness, hemodynamic instability, hypertension, hypotension, palpitations, shortness of breath, syncope, tachycardia and weakness. The symptoms have been stable. Past treatments include nothing.    Hypertension  This is a chronic problem. The problem is controlled (usually 120-130/60-70 mmHg at home). Pertinent negatives include no anxiety, blurred vision, chest pain, headaches, malaise/fatigue, neck pain, orthopnea, palpitations, peripheral edema, PND, shortness of breath or sweats.       Review of Systems   Constitutional: Negative for chills, fever and malaise/fatigue.   HENT:  Negative for nosebleeds.    Eyes:  Negative for blurred vision, discharge, vision loss in left eye and vision loss in right eye.   Cardiovascular:  Negative for chest pain, claudication, dyspnea on exertion, irregular heartbeat, leg swelling, near-syncope, orthopnea, palpitations, paroxysmal nocturnal dyspnea and syncope.   Respiratory:  Negative for cough, hemoptysis, shortness of breath and wheezing.    Endocrine: Negative for cold intolerance and heat intolerance.   Hematologic/Lymphatic: Negative for bleeding problem. Does not bruise/bleed easily.   Skin:  Negative for color change and rash.   Musculoskeletal:  Negative for arthritis, falls, myalgias and neck pain.   Gastrointestinal:  Negative for heartburn, hematemesis, hematochezia, hemorrhoids, jaundice, melena, nausea and vomiting.   Genitourinary:  Negative for dysuria and hematuria.   Neurological:  Negative for dizziness, focal weakness, headaches, light-headedness, loss of balance, vertigo and weakness.   Psychiatric/Behavioral:  Negative for altered mental status, depression and substance abuse. The patient is not nervous/anxious.    Allergic/Immunologic: Negative for hives and persistent infections.       Current Outpatient Medications on File Prior to Visit   Medication Sig Dispense Refill    amLODIPine (NORVASC) 2.5 MG tablet Take 1 tablet (2.5 mg total) by mouth once daily. 90 tablet 3    apixaban (ELIQUIS) 5 mg Tab Take 1 tablet (5 mg total) by mouth 2 (two) times daily. 180 tablet 3    losartan (COZAAR) 100 MG tablet Take 1 tablet (100 mg total) by mouth once  "daily. 90 tablet 3    sildenafiL (VIAGRA) 100 MG tablet Take 100 mg by mouth daily as needed for Erectile Dysfunction.      vitamin E 400 UNIT capsule Take 400 Units by mouth once daily.       No current facility-administered medications on file prior to visit.       /60   Pulse 82   Ht 6' 1.2" (1.859 m)   Wt 65.2 kg (143 lb 11.8 oz)   SpO2 97%   BMI 18.86 kg/m²     Objective:     Physical Exam  Constitutional:       General: He is not in acute distress.     Appearance: Normal appearance. He is well-developed. He is not ill-appearing or diaphoretic.   HENT:      Head: Normocephalic and atraumatic.      Nose: Nose normal.      Mouth/Throat:      Pharynx: No oropharyngeal exudate.   Eyes:      General:         Right eye: No discharge.         Left eye: No discharge.      Conjunctiva/sclera:      Right eye: No hemorrhage.     Left eye: No hemorrhage.     Pupils: Pupils are equal.      Right eye: Pupil is round.      Left eye: Pupil is round.   Neck:      Thyroid: No thyromegaly.      Vascular: No carotid bruit or JVD.   Cardiovascular:      Rate and Rhythm: Normal rate. Rhythm irregularly irregular.      Chest Wall: PMI is not displaced.      Pulses:           Radial pulses are 2+ on the right side and 2+ on the left side.        Dorsalis pedis pulses are 2+ on the right side and 2+ on the left side.        Posterior tibial pulses are 2+ on the right side and 2+ on the left side.      Heart sounds: S1 normal and S2 normal. No murmur heard.     No gallop.   Pulmonary:      Effort: Pulmonary effort is normal.      Breath sounds: Normal breath sounds.   Abdominal:      Palpations: Abdomen is soft.      Tenderness: There is no abdominal tenderness.   Musculoskeletal:      Cervical back: Neck supple.      Right ankle: No swelling, deformity or ecchymosis.      Left ankle: No swelling, deformity or ecchymosis.   Lymphadenopathy:      Head:      Right side of head: No submandibular adenopathy.      Left side of " head: No submandibular adenopathy.      Cervical: No cervical adenopathy.   Skin:     General: Skin is warm and dry.      Findings: No rash.   Neurological:      General: No focal deficit present.      Mental Status: He is alert and oriented to person, place, and time. He is not disoriented.      Cranial Nerves: No cranial nerve deficit.      Sensory: No sensory deficit.   Psychiatric:         Attention and Perception: Attention and perception normal.         Mood and Affect: Mood and affect normal.         Speech: Speech normal.         Behavior: Behavior normal.         Thought Content: Thought content normal.         Cognition and Memory: Cognition normal.         Judgment: Judgment normal.         Assessment:      1. Permanent atrial fibrillation    2. Chronic anticoagulation    3. Primary hypertension        Plan:     1. Atrial Fibrillation   4/17/2015: Diagnosed with essentially asymptomatic paroxysmal atrial fibrillation.    CHA2DS2VASc=3 (HA2).    5/26/2015: Echo: Normal left ventricular size with mildly depressed systolic function. Normal LA size.   5/29/2015: Holter: Paroxysmal atrial fibrillation with intermittent atrial fibrillation with well as well as fast VRR. Sinus in between. No symptoms.   1/21/2016: ECG: NSR but on exam appeared to be in atrial fibrillation.   12/8/2016: ECG: Atrial fibrillation with VRR of 75 bpm.   6/12/2017: Holter: Atrial fibrillation 76 () bpm.   8/8/2018: Began apixiban.   6/3/2021: Metoprolol 25 mg Q24 was discontinued as rate appeared on slightly slow side.   VRR appears well controlled with no AV slowing agent.   Essentially asymptomatic.        2. Chronic Anticoagulation   4/17/2015: Diagnosed with essentially asymptomatic paroxysmal atrial fibrillation.    CHA2DS2VASc=3 (HA2).    6/2016: Apixiban was advised but not begun.   8/8/2018: Apixiban was begun.   On apixiban 5 mg Q12.   No aspirin or NSAID.   No bleeding.    3. Hypertension   12/12/2018: 149/85  mmHg.   4/24/2019: 137/66 mmHg.   10/1/2020: 152/83 mmHg.   10/2021: Diagnosed.   10/19/2021: Losartan 50 mg Q24 was begun.   6/7/2022: Losartan 50 mg Q24 was increased to losartan 100 mg Q24 as pressure was running 130-150/70-80 mmHg at home.    3/1/2023: Amlodipine 2.5 mg Q24 was begun as pressure slightly high.    On amlodipine 2.5 mg Q24 and losartan 100 mg Q24.   Keeping log at home.   Well controlled.      4. Primary Care   Dr. Monet Leach.    F/u 4 months.    Rickie Gallardo M.D.

## 2024-08-21 DIAGNOSIS — I10 PRIMARY HYPERTENSION: ICD-10-CM

## 2024-08-21 RX ORDER — AMLODIPINE BESYLATE 2.5 MG/1
2.5 TABLET ORAL
Qty: 90 TABLET | Refills: 3 | Status: SHIPPED | OUTPATIENT
Start: 2024-08-21

## 2024-08-21 RX ORDER — LOSARTAN POTASSIUM 100 MG/1
100 TABLET ORAL
Qty: 90 TABLET | Refills: 3 | Status: SHIPPED | OUTPATIENT
Start: 2024-08-21

## 2024-12-17 ENCOUNTER — OFFICE VISIT (OUTPATIENT)
Dept: CARDIOLOGY | Facility: CLINIC | Age: 74
End: 2024-12-17
Attending: INTERNAL MEDICINE
Payer: MEDICARE

## 2024-12-17 VITALS
SYSTOLIC BLOOD PRESSURE: 122 MMHG | HEIGHT: 73 IN | OXYGEN SATURATION: 97 % | DIASTOLIC BLOOD PRESSURE: 68 MMHG | WEIGHT: 146.19 LBS | HEART RATE: 73 BPM | BODY MASS INDEX: 19.37 KG/M2

## 2024-12-17 DIAGNOSIS — I10 PRIMARY HYPERTENSION: ICD-10-CM

## 2024-12-17 DIAGNOSIS — Z79.01 CHRONIC ANTICOAGULATION: ICD-10-CM

## 2024-12-17 DIAGNOSIS — I48.21 PERMANENT ATRIAL FIBRILLATION: ICD-10-CM

## 2024-12-17 LAB
OHS QRS DURATION: 90 MS
OHS QTC CALCULATION: 444 MS

## 2024-12-17 PROCEDURE — 1159F MED LIST DOCD IN RCRD: CPT | Mod: CPTII,S$GLB,, | Performed by: INTERNAL MEDICINE

## 2024-12-17 PROCEDURE — 93005 ELECTROCARDIOGRAM TRACING: CPT

## 2024-12-17 PROCEDURE — 1101F PT FALLS ASSESS-DOCD LE1/YR: CPT | Mod: CPTII,S$GLB,, | Performed by: INTERNAL MEDICINE

## 2024-12-17 PROCEDURE — 99214 OFFICE O/P EST MOD 30 MIN: CPT | Mod: 25,S$GLB,, | Performed by: INTERNAL MEDICINE

## 2024-12-17 PROCEDURE — 1126F AMNT PAIN NOTED NONE PRSNT: CPT | Mod: CPTII,S$GLB,, | Performed by: INTERNAL MEDICINE

## 2024-12-17 PROCEDURE — 1160F RVW MEDS BY RX/DR IN RCRD: CPT | Mod: CPTII,S$GLB,, | Performed by: INTERNAL MEDICINE

## 2024-12-17 PROCEDURE — 3078F DIAST BP <80 MM HG: CPT | Mod: CPTII,S$GLB,, | Performed by: INTERNAL MEDICINE

## 2024-12-17 PROCEDURE — 99999 PR PBB SHADOW E&M-EST. PATIENT-LVL III: CPT | Mod: PBBFAC,,, | Performed by: INTERNAL MEDICINE

## 2024-12-17 PROCEDURE — 3288F FALL RISK ASSESSMENT DOCD: CPT | Mod: CPTII,S$GLB,, | Performed by: INTERNAL MEDICINE

## 2024-12-17 PROCEDURE — 93000 ELECTROCARDIOGRAM COMPLETE: CPT | Mod: S$GLB,,, | Performed by: INTERNAL MEDICINE

## 2024-12-17 PROCEDURE — 3074F SYST BP LT 130 MM HG: CPT | Mod: CPTII,S$GLB,, | Performed by: INTERNAL MEDICINE

## 2024-12-17 NOTE — PROGRESS NOTES
Subjective:     Hany Glass is a 74 y.o. male with hypertension. He has a heathy weight. He noted mild palpitations in about 12/2014. The palpitations did not really bother him. He saw his internist on 4/17/2015 and was noted to be in atrial fibrillation with well controlled ventricular response rate. He was referred for an echocardiogram on 5/26/201. The echocardiogram revealed normal left ventricular size with mildly depressed systolic function. The left atrium was of normal size. On 5/29/2015 he underwent a holter which revealed paroxysmal atrial fibrillation with intermittent atrial fibrillation with well controlled as well as fast ventricular response rate with activities. There was normal sinus rhythm in between. He reported no symptoms during the holter recording. I saw him on 9/18/2015 and he then had a [id number] score of 1 (A). In the setting of mild left ventricular dysfunction it was felt he should be anticoagulated and he was advised to begin rivaroxaban and stop aspirin. He was given the prescription but decided to stay on the aspirin as he was feeling well and did not have any palpitations. He was feeling well in 1/2016 when I saw him and he again opted to stay on aspirin. He began apixiban in 6/2016. On 6/12/2017 he had a holter which revealed persistent atrial fibrillation 76 () bpm. In 10/2021 it appeared clear that he had hypertension. With that he has a [id number] score of 3 (HA2). No exertional chest pain or exertional dyspnea. No palpitations or weak spells. No bleeding. No issues with any of his prescribed medications. Feeling well overall.      Atrial Fibrillation  Presents for follow-up visit. Symptoms are negative for bradycardia, chest pain, dizziness, hemodynamic instability, hypertension, hypotension, palpitations, shortness of breath, syncope, tachycardia and weakness. The symptoms have been stable.   Hypertension  This is a chronic problem. The problem is controlled  (usually 120-130/60-70 mmHg at home). Pertinent negatives include no anxiety, blurred vision, chest pain, headaches, malaise/fatigue, neck pain, orthopnea, palpitations, peripheral edema, PND, shortness of breath or sweats.       Review of Systems   Constitutional: Negative for chills, fever and malaise/fatigue.   HENT:  Negative for nosebleeds.    Eyes:  Negative for blurred vision, discharge, vision loss in left eye and vision loss in right eye.   Cardiovascular:  Negative for chest pain, claudication, dyspnea on exertion, irregular heartbeat, leg swelling, near-syncope, orthopnea, palpitations, paroxysmal nocturnal dyspnea and syncope.   Respiratory:  Negative for cough, hemoptysis, shortness of breath and wheezing.    Endocrine: Negative for cold intolerance and heat intolerance.   Hematologic/Lymphatic: Negative for bleeding problem. Does not bruise/bleed easily.   Skin:  Negative for color change and rash.   Musculoskeletal:  Negative for arthritis, falls, myalgias and neck pain.   Gastrointestinal:  Negative for heartburn, hematemesis, hematochezia, hemorrhoids, jaundice, melena, nausea and vomiting.   Genitourinary:  Negative for dysuria and hematuria.   Neurological:  Negative for dizziness, focal weakness, headaches, light-headedness, loss of balance, vertigo and weakness.   Psychiatric/Behavioral:  Negative for altered mental status, depression and substance abuse. The patient is not nervous/anxious.    Allergic/Immunologic: Negative for hives and persistent infections.       Current Outpatient Medications on File Prior to Visit   Medication Sig Dispense Refill    amLODIPine (NORVASC) 2.5 MG tablet TAKE 1 TABLET(2.5 MG) BY MOUTH EVERY DAY 90 tablet 3    apixaban (ELIQUIS) 5 mg Tab Take 1 tablet (5 mg total) by mouth 2 (two) times daily. 180 tablet 3    losartan (COZAAR) 100 MG tablet TAKE 1 TABLET(100 MG) BY MOUTH EVERY DAY 90 tablet 3    sildenafiL (VIAGRA) 100 MG tablet Take 100 mg by mouth daily as  "needed for Erectile Dysfunction.      vitamin E 400 UNIT capsule Take 400 Units by mouth once daily.       No current facility-administered medications on file prior to visit.       /68   Pulse 73   Ht 6' 1.2" (1.859 m)   Wt 66.3 kg (146 lb 2.6 oz)   SpO2 97%   BMI 19.18 kg/m²     Objective:     Physical Exam  Constitutional:       General: He is not in acute distress.     Appearance: Normal appearance. He is well-developed. He is not ill-appearing or diaphoretic.   HENT:      Head: Normocephalic and atraumatic.      Nose: Nose normal.      Mouth/Throat:      Pharynx: No oropharyngeal exudate.   Eyes:      General:         Right eye: No discharge.         Left eye: No discharge.      Conjunctiva/sclera:      Right eye: No hemorrhage.     Left eye: No hemorrhage.     Pupils: Pupils are equal.      Right eye: Pupil is round.      Left eye: Pupil is round.   Neck:      Thyroid: No thyromegaly.      Vascular: No carotid bruit or JVD.   Cardiovascular:      Rate and Rhythm: Normal rate. Rhythm irregularly irregular.      Chest Wall: PMI is not displaced.      Pulses:           Radial pulses are 2+ on the right side and 2+ on the left side.        Dorsalis pedis pulses are 2+ on the right side and 2+ on the left side.        Posterior tibial pulses are 2+ on the right side and 2+ on the left side.      Heart sounds: S1 normal and S2 normal. No murmur heard.     No gallop.   Pulmonary:      Effort: Pulmonary effort is normal.      Breath sounds: Normal breath sounds.   Abdominal:      Palpations: Abdomen is soft.      Tenderness: There is no abdominal tenderness.   Musculoskeletal:      Cervical back: Neck supple.      Right ankle: No swelling, deformity or ecchymosis.      Left ankle: No swelling, deformity or ecchymosis.   Lymphadenopathy:      Head:      Right side of head: No submandibular adenopathy.      Left side of head: No submandibular adenopathy.      Cervical: No cervical adenopathy.   Skin:     " General: Skin is warm and dry.      Findings: No rash.   Neurological:      General: No focal deficit present.      Mental Status: He is alert and oriented to person, place, and time. He is not disoriented.      Cranial Nerves: No cranial nerve deficit.      Sensory: No sensory deficit.   Psychiatric:         Attention and Perception: Attention and perception normal.         Mood and Affect: Mood and affect normal.         Speech: Speech normal.         Behavior: Behavior normal.         Thought Content: Thought content normal.         Cognition and Memory: Cognition normal.         Judgment: Judgment normal.         Assessment:      1. Permanent atrial fibrillation    2. Chronic anticoagulation    3. Primary hypertension        Plan:     1. Atrial Fibrillation   4/17/2015: Diagnosed with essentially asymptomatic paroxysmal atrial fibrillation.    CHA2DS2VASc=3 (HA2).    5/26/2015: Echo: Normal left ventricular size with mildly depressed systolic function. Normal LA size.   5/29/2015: Holter: Paroxysmal atrial fibrillation with intermittent atrial fibrillation with well as well as fast VRR. Sinus in between. No symptoms.   1/21/2016: ECG: NSR but on exam appeared to be in atrial fibrillation.   12/8/2016: ECG: Atrial fibrillation with VRR of 75 bpm.   6/12/2017: Holter: Atrial fibrillation 76 () bpm.   8/8/2018: Began apixiban.   6/3/2021: Metoprolol 25 mg Q24 was discontinued as rate appeared on slightly slow side.   VRR appears well controlled with no AV slowing agent.   Essentially asymptomatic.        2. Chronic Anticoagulation   4/17/2015: Diagnosed with essentially asymptomatic paroxysmal atrial fibrillation.    CHA2DS2VASc=3 (HA2).    6/2016: Apixiban was advised but not begun.   8/8/2018: Apixiban was begun.   On apixiban 5 mg Q12.   No aspirin or NSAID.   No bleeding.    3. Hypertension   12/12/2018: 149/85 mmHg.   4/24/2019: 137/66 mmHg.   10/1/2020: 152/83 mmHg.   10/2021: Diagnosed.   10/19/2021:  Losartan 50 mg Q24 was begun.   6/7/2022: Losartan 50 mg Q24 was increased to losartan 100 mg Q24 as pressure was running 130-150/70-80 mmHg at home.    3/1/2023: Amlodipine 2.5 mg Q24 was begun as pressure slightly high.    On amlodipine 2.5 mg Q24 and losartan 100 mg Q24.   Keeping log at home.   Well controlled.      4. Primary Care   Dr. Monet Leach.    F/u 4 months.    Rickie Gallardo M.D.

## 2025-04-24 ENCOUNTER — OFFICE VISIT (OUTPATIENT)
Dept: CARDIOLOGY | Facility: CLINIC | Age: 75
End: 2025-04-24
Attending: INTERNAL MEDICINE
Payer: MEDICARE

## 2025-04-24 VITALS
HEART RATE: 88 BPM | BODY MASS INDEX: 18.65 KG/M2 | WEIGHT: 140.75 LBS | SYSTOLIC BLOOD PRESSURE: 118 MMHG | OXYGEN SATURATION: 96 % | DIASTOLIC BLOOD PRESSURE: 56 MMHG | HEIGHT: 73 IN

## 2025-04-24 DIAGNOSIS — Z79.01 CHRONIC ANTICOAGULATION: ICD-10-CM

## 2025-04-24 DIAGNOSIS — I48.21 PERMANENT ATRIAL FIBRILLATION: ICD-10-CM

## 2025-04-24 DIAGNOSIS — I10 PRIMARY HYPERTENSION: ICD-10-CM

## 2025-04-24 PROCEDURE — 3288F FALL RISK ASSESSMENT DOCD: CPT | Mod: CPTII,S$GLB,, | Performed by: INTERNAL MEDICINE

## 2025-04-24 PROCEDURE — 1159F MED LIST DOCD IN RCRD: CPT | Mod: CPTII,S$GLB,, | Performed by: INTERNAL MEDICINE

## 2025-04-24 PROCEDURE — 3078F DIAST BP <80 MM HG: CPT | Mod: CPTII,S$GLB,, | Performed by: INTERNAL MEDICINE

## 2025-04-24 PROCEDURE — 1101F PT FALLS ASSESS-DOCD LE1/YR: CPT | Mod: CPTII,S$GLB,, | Performed by: INTERNAL MEDICINE

## 2025-04-24 PROCEDURE — 1126F AMNT PAIN NOTED NONE PRSNT: CPT | Mod: CPTII,S$GLB,, | Performed by: INTERNAL MEDICINE

## 2025-04-24 PROCEDURE — 99999 PR PBB SHADOW E&M-EST. PATIENT-LVL III: CPT | Mod: PBBFAC,,, | Performed by: INTERNAL MEDICINE

## 2025-04-24 PROCEDURE — 99214 OFFICE O/P EST MOD 30 MIN: CPT | Mod: S$GLB,,, | Performed by: INTERNAL MEDICINE

## 2025-04-24 PROCEDURE — 3074F SYST BP LT 130 MM HG: CPT | Mod: CPTII,S$GLB,, | Performed by: INTERNAL MEDICINE

## 2025-04-24 RX ORDER — LOSARTAN POTASSIUM 100 MG/1
100 TABLET ORAL DAILY
Qty: 90 TABLET | Refills: 3 | Status: SHIPPED | OUTPATIENT
Start: 2025-04-24

## 2025-04-24 RX ORDER — AMLODIPINE BESYLATE 2.5 MG/1
2.5 TABLET ORAL DAILY
Qty: 90 TABLET | Refills: 3 | Status: SHIPPED | OUTPATIENT
Start: 2025-04-24

## 2025-04-24 NOTE — PROGRESS NOTES
Subjective:     Hany Glass is a 74 y.o. male with hypertension. He has a heathy weight. He noted mild palpitations in about 12/2014. The palpitations did not really bother him. He saw his internist on 4/17/2015 and was noted to be in atrial fibrillation with well controlled ventricular response rate. He was referred for an echocardiogram on 5/26/201. The echocardiogram revealed normal left ventricular size with mildly depressed systolic function. The left atrium was of normal size. On 5/29/2015 he underwent a holter which revealed paroxysmal atrial fibrillation with intermittent atrial fibrillation with well controlled as well as fast ventricular response rate with activities. There was normal sinus rhythm in between. He reported no symptoms during the holter recording. I saw him on 9/18/2015 and he then had a [id number] score of 1 (A). In the setting of mild left ventricular dysfunction it was felt he should be anticoagulated and he was advised to begin rivaroxaban and stop aspirin. He was given the prescription but decided to stay on the aspirin as he was feeling well and did not have any palpitations. He was feeling well in 1/2016 when I saw him and he again opted to stay on aspirin. He began apixiban in 6/2016. On 6/12/2017 he had a holter which revealed persistent atrial fibrillation 76 () bpm. In 10/2021 it appeared clear that he had hypertension. With that he has a [id number] score of 3 (HA2). No exertional chest pain or exertional dyspnea. No palpitations or weak spells. No bleeding. No issues with any of his prescribed medications. Feeling well overall.      Atrial Fibrillation  Presents for follow-up visit. Symptoms are negative for bradycardia, chest pain, dizziness, hemodynamic instability, hypertension, hypotension, palpitations, shortness of breath, syncope, tachycardia and weakness. The symptoms have been stable.   Hypertension  This is a chronic problem. The problem is controlled  (usually 120-130/60-70 mmHg at home). Pertinent negatives include no anxiety, blurred vision, chest pain, headaches, malaise/fatigue, neck pain, orthopnea, palpitations, peripheral edema, PND, shortness of breath or sweats.       Review of Systems   Constitutional: Negative for chills, fever and malaise/fatigue.   HENT:  Negative for nosebleeds.    Eyes:  Negative for blurred vision, discharge, vision loss in left eye and vision loss in right eye.   Cardiovascular:  Negative for chest pain, claudication, dyspnea on exertion, irregular heartbeat, leg swelling, near-syncope, orthopnea, palpitations, paroxysmal nocturnal dyspnea and syncope.   Respiratory:  Negative for cough, hemoptysis, shortness of breath and wheezing.    Endocrine: Negative for cold intolerance and heat intolerance.   Hematologic/Lymphatic: Negative for bleeding problem. Does not bruise/bleed easily.   Skin:  Negative for color change and rash.   Musculoskeletal:  Negative for arthritis, falls, myalgias and neck pain.   Gastrointestinal:  Negative for heartburn, hematemesis, hematochezia, hemorrhoids, jaundice, melena, nausea and vomiting.   Genitourinary:  Negative for dysuria and hematuria.   Neurological:  Negative for dizziness, focal weakness, headaches, light-headedness, loss of balance, vertigo and weakness.   Psychiatric/Behavioral:  Negative for altered mental status, depression and substance abuse. The patient is not nervous/anxious.    Allergic/Immunologic: Negative for hives and persistent infections.       Current Outpatient Medications on File Prior to Visit   Medication Sig Dispense Refill    amLODIPine (NORVASC) 2.5 MG tablet TAKE 1 TABLET(2.5 MG) BY MOUTH EVERY DAY 90 tablet 3    apixaban (ELIQUIS) 5 mg Tab Take 1 tablet (5 mg total) by mouth 2 (two) times daily. 180 tablet 3    losartan (COZAAR) 100 MG tablet TAKE 1 TABLET(100 MG) BY MOUTH EVERY DAY 90 tablet 3    sildenafiL (VIAGRA) 100 MG tablet Take 100 mg by mouth daily as  "needed for Erectile Dysfunction.      vitamin E 400 UNIT capsule Take 400 Units by mouth once daily.       No current facility-administered medications on file prior to visit.       BP (!) 118/56   Pulse 88   Ht 6' 1.2" (1.859 m)   Wt 63.9 kg (140 lb 12.2 oz)   SpO2 96%   BMI 18.47 kg/m²     Objective:     Physical Exam  Constitutional:       General: He is not in acute distress.     Appearance: Normal appearance. He is well-developed. He is not ill-appearing or diaphoretic.   HENT:      Head: Normocephalic and atraumatic.      Nose: Nose normal.      Mouth/Throat:      Pharynx: No oropharyngeal exudate.   Eyes:      General:         Right eye: No discharge.         Left eye: No discharge.      Conjunctiva/sclera:      Right eye: No hemorrhage.     Left eye: No hemorrhage.     Pupils: Pupils are equal.      Right eye: Pupil is round.      Left eye: Pupil is round.   Neck:      Thyroid: No thyromegaly.      Vascular: No carotid bruit or JVD.   Cardiovascular:      Rate and Rhythm: Normal rate. Rhythm irregularly irregular.      Chest Wall: PMI is not displaced.      Pulses:           Radial pulses are 2+ on the right side and 2+ on the left side.        Dorsalis pedis pulses are 2+ on the right side and 2+ on the left side.        Posterior tibial pulses are 2+ on the right side and 2+ on the left side.      Heart sounds: S1 normal and S2 normal. No murmur heard.     No gallop.   Pulmonary:      Effort: Pulmonary effort is normal.      Breath sounds: Normal breath sounds.   Abdominal:      Palpations: Abdomen is soft.      Tenderness: There is no abdominal tenderness.   Musculoskeletal:      Cervical back: Neck supple.      Right ankle: No swelling, deformity or ecchymosis.      Left ankle: No swelling, deformity or ecchymosis.   Lymphadenopathy:      Head:      Right side of head: No submandibular adenopathy.      Left side of head: No submandibular adenopathy.      Cervical: No cervical adenopathy.   Skin:     " General: Skin is warm and dry.      Findings: No rash.   Neurological:      General: No focal deficit present.      Mental Status: He is alert and oriented to person, place, and time. He is not disoriented.      Cranial Nerves: No cranial nerve deficit.      Sensory: No sensory deficit.   Psychiatric:         Attention and Perception: Attention and perception normal.         Mood and Affect: Mood and affect normal.         Speech: Speech normal.         Behavior: Behavior normal.         Thought Content: Thought content normal.         Cognition and Memory: Cognition normal.         Judgment: Judgment normal.         Assessment:      1. Permanent atrial fibrillation    2. Chronic anticoagulation    3. Primary hypertension        Plan:     1. Atrial Fibrillation   4/17/2015: Diagnosed with essentially asymptomatic paroxysmal atrial fibrillation.    CHA2DS2VASc=3 (HA2).    5/26/2015: Echo: Normal left ventricular size with mildly depressed systolic function. Normal LA size.   5/29/2015: Holter: Paroxysmal atrial fibrillation with intermittent atrial fibrillation with well as well as fast VRR. Sinus in between. No symptoms.   1/21/2016: ECG: NSR but on exam appeared to be in atrial fibrillation.   12/8/2016: ECG: Atrial fibrillation with VRR of 75 bpm.   6/12/2017: Holter: Atrial fibrillation 76 () bpm.   8/8/2018: Began apixiban.   6/3/2021: Metoprolol 25 mg Q24 was discontinued as rate appeared on slightly slow side.   VRR appears well controlled with no AV slowing agent.   Essentially asymptomatic.        2. Chronic Anticoagulation   4/17/2015: Diagnosed with essentially asymptomatic paroxysmal atrial fibrillation.    CHA2DS2VASc=3 (HA2).    6/2016: Apixiban was advised but not begun.   8/8/2018: Apixiban was begun.   On apixiban 5 mg Q12.   No aspirin or NSAID.   No bleeding.    3. Hypertension   12/12/2018: 149/85 mmHg.   4/24/2019: 137/66 mmHg.   10/1/2020: 152/83 mmHg.   10/2021: Diagnosed.   10/19/2021:  Losartan 50 mg Q24 was begun.   6/7/2022: Losartan 50 mg Q24 was increased to losartan 100 mg Q24 as pressure was running 130-150/70-80 mmHg at home.    3/1/2023: Amlodipine 2.5 mg Q24 was begun as pressure slightly high.    On amlodipine 2.5 mg Q24 and losartan 100 mg Q24.   Keeping log at home.   Well controlled.      4. Primary Care   Dr. Monet Leach.    F/u 6 months.    Rickie Gallardo M.D.